# Patient Record
Sex: FEMALE | Race: WHITE | NOT HISPANIC OR LATINO | Employment: FULL TIME | URBAN - METROPOLITAN AREA
[De-identification: names, ages, dates, MRNs, and addresses within clinical notes are randomized per-mention and may not be internally consistent; named-entity substitution may affect disease eponyms.]

---

## 2017-04-12 DIAGNOSIS — F32.5 MAJOR DEPRESSION IN COMPLETE REMISSION (H): ICD-10-CM

## 2017-04-12 DIAGNOSIS — F41.1 GENERALIZED ANXIETY DISORDER: ICD-10-CM

## 2017-04-12 NOTE — TELEPHONE ENCOUNTER
Sertraline     Last Written Prescription Date: 10/11/16  Last Fill Quantity: 180, # refills: 1  Last Office Visit with [facility] primary care provider:  10/11/16   Next 5 appointments (look out 90 days)     Apr 17, 2017 10:00 AM CHIKIT   Abdulkadir Deluca with BRANNON Cerda CNP   [facility] ([facility]    [address] [address]   [phone]                   Last PHQ-9 score on record=   PHQ-9 SCORE 10/11/2016   Total Score 0

## 2017-04-17 ENCOUNTER — OFFICE VISIT (OUTPATIENT)
Dept: FAMILY MEDICINE | Facility: CLINIC | Age: 42
End: 2017-04-17
Payer: COMMERCIAL

## 2017-04-17 VITALS
BODY MASS INDEX: 26.42 KG/M2 | WEIGHT: 164.38 LBS | RESPIRATION RATE: 16 BRPM | TEMPERATURE: 97.9 F | SYSTOLIC BLOOD PRESSURE: 114 MMHG | HEIGHT: 66 IN | DIASTOLIC BLOOD PRESSURE: 66 MMHG | HEART RATE: 69 BPM

## 2017-04-17 DIAGNOSIS — F41.1 GAD (GENERALIZED ANXIETY DISORDER): Primary | ICD-10-CM

## 2017-04-17 DIAGNOSIS — F32.5 MAJOR DEPRESSION IN COMPLETE REMISSION (H): ICD-10-CM

## 2017-04-17 PROCEDURE — 99213 OFFICE O/P EST LOW 20 MIN: CPT | Performed by: NURSE PRACTITIONER

## 2017-04-17 RX ORDER — SERTRALINE HYDROCHLORIDE 100 MG/1
200 TABLET, FILM COATED ORAL DAILY
Qty: 180 TABLET | Refills: 3 | Status: SHIPPED | OUTPATIENT
Start: 2017-04-17 | End: 2018-04-02

## 2017-04-17 ASSESSMENT — ANXIETY QUESTIONNAIRES
7. FEELING AFRAID AS IF SOMETHING AWFUL MIGHT HAPPEN: NOT AT ALL
GAD7 TOTAL SCORE: 0
3. WORRYING TOO MUCH ABOUT DIFFERENT THINGS: NOT AT ALL
IF YOU CHECKED OFF ANY PROBLEMS ON THIS QUESTIONNAIRE, HOW DIFFICULT HAVE THESE PROBLEMS MADE IT FOR YOU TO DO YOUR WORK, TAKE CARE OF THINGS AT HOME, OR GET ALONG WITH OTHER PEOPLE: NOT DIFFICULT AT ALL
6. BECOMING EASILY ANNOYED OR IRRITABLE: NOT AT ALL
5. BEING SO RESTLESS THAT IT IS HARD TO SIT STILL: NOT AT ALL
2. NOT BEING ABLE TO STOP OR CONTROL WORRYING: NOT AT ALL
1. FEELING NERVOUS, ANXIOUS, OR ON EDGE: NOT AT ALL

## 2017-04-17 ASSESSMENT — PATIENT HEALTH QUESTIONNAIRE - PHQ9: 5. POOR APPETITE OR OVEREATING: NOT AT ALL

## 2017-04-17 NOTE — MR AVS SNAPSHOT
After Visit Summary   4/17/2017    Sofia Garcia    MRN: [id number]           Patient Information     Date Of Birth          1975        Visit Information        Provider Department      4/17/2017 10:00 AM Mary Clement APRN CNP [facility]        Today's Diagnoses     BRENDEN (generalized anxiety disorder)    -  1    Generalized anxiety disorder        Major depression in complete remission (H)          Care Instructions          Thank you for choosing [facility].  You may be receiving a survey in the mail from Ela Cabral regarding your visit today.  Please take a few minutes to complete and return the survey to let us know how we are doing.      If you have questions or concerns, please contact us via atVenu or you can contact your care team at [phone].    Our Clinic hours are:  Monday 6:40 am  to 7:00 pm  Tuesday -Friday 6:40 am to 5:00 pm    The [place] outpatient lab hours are:  Monday - Friday 6:10 am to 4:45 pm  Saturdays 7:00 am to 11:00 am  Appointments are required, call [phone]    If you have clinical questions after hours or would like to schedule an appointment,  call the clinic at [phone].        Follow-ups after your visit        Who to contact     If you have questions or need follow up information about today's clinic visit or your schedule please contact [facility] directly at [phone].  Normal or non-critical lab and imaging results will be communicated to you by Total-traxhart, letter or phone within 4 business days after the clinic has received the results. If you do not hear from us within 7 days, please contact the clinic through Usoundt or phone. If you have a critical or abnormal lab result, we will notify you by phone as soon as possible.  Submit refill requests through atVenu or call your pharmacy and they will forward the refill request to us. Please allow 3 business days for your refill to be completed.           "Additional Information About Your Visit        MyChart Information     [employer] gives you secure access to your electronic health record. If you see a primary care provider, you can also send messages to your care team and make appointments. If you have questions, please call your primary care clinic.  If you do not have a primary care provider, please call [phone] and they will assist you.        Care EveryWhere ID     This is your Care EveryWhere ID. This could be used by other organizations to access your [facility] medical records  [id number]        Your Vitals Were     Pulse Temperature Respirations Height Last Period BMI (Body Mass Index)    69 97.9  F (36.6  C) (Tympanic) 16 5' 5.55\" (1.665 m) 03/27/2017 26.9 kg/m2       Blood Pressure from Last 3 Encounters:   04/17/17 114/66   10/11/16 101/67   08/11/16 106/63    Weight from Last 3 Encounters:   04/17/17 164 lb 6 oz (74.6 kg)   10/11/16 161 lb 2 oz (73.1 kg)   08/11/16 174 lb (78.9 kg)              Today, you had the following     No orders found for display         Where to get your medicines      These medications were sent to [facility] [address] [place], [place] [address] [facility] AT [address]  [address] [facility] [address]     Phone:  [phone]     sertraline 100 MG tablet          Primary Care Provider Office Phone # Fax #    Mary BRANNON [facility] [phone]       [facility] [address] [facility] [address]        Thank you!     Thank you for choosing [facility]  for your care. Our goal is always to provide you with excellent care. Hearing back from our patients is one way we can continue to improve our services. Please take a few minutes to complete the written survey that you may receive in the mail after your visit with us. Thank you!             Your Updated Medication List - Protect others around you: Learn how to safely use, store and throw away your " medicines at www.disposemymeds.org.          This list is accurate as of: 4/17/17 10:20 AM.  Always use your most recent med list.                   Brand Name Dispense Instructions for use    sertraline 100 MG tablet    ZOLOFT    180 tablet    Take 2 tablets (200 mg) by mouth daily

## 2017-04-17 NOTE — PATIENT INSTRUCTIONS
Thank you for choosing [facility].  You may be receiving a survey in the mail from Ela Cabral regarding your visit today.  Please take a few minutes to complete and return the survey to let us know how we are doing.      If you have questions or concerns, please contact us via PowerPractical or you can contact your care team at [phone].    Our Clinic hours are:  Monday 6:40 am  to 7:00 pm  Tuesday -Friday 6:40 am to 5:00 pm    The [place] outpatient lab hours are:  Monday - Friday 6:10 am to 4:45 pm  Saturdays 7:00 am to 11:00 am  Appointments are required, call [phone]    If you have clinical questions after hours or would like to schedule an appointment,  call the clinic at [phone].

## 2017-04-17 NOTE — NURSING NOTE
"Chief Complaint   Patient presents with     Anxiety     Recheck and refills.  Phq9/Thang completed today.       Initial /66  Pulse 69  Temp 97.9  F (36.6  C) (Tympanic)  Resp 16  Ht 5' 5.55\" (1.665 m)  Wt 164 lb 6 oz (74.6 kg)  LMP 03/27/2017  BMI 26.9 kg/m2 Estimated body mass index is 26.9 kg/(m^2) as calculated from the following:    Height as of this encounter: 5' 5.55\" (1.665 m).    Weight as of this encounter: 164 lb 6 oz (74.6 kg).  Medication Reconciliation: complete  "

## 2017-04-17 NOTE — PROGRESS NOTES
SUBJECTIVE:                                                    Sofia Garcia is a 42 year old female who presents to clinic today for the following health issues:      Anxiety Follow-Up    Status since last visit: No change    Other associated symptoms:None    Complicating factors:   Significant life event: No   Current substance abuse: None  Depression symptoms: No  BRENDEN-7 SCORE 4/13/2016 10/11/2016 4/17/2017   Total Score 0 1 0        GAD7       Amount of exercise or physical activity: tries to walk on treadmill 2-3 times a week for 50 minutes.    Problems taking medications regularly: No    Medication side effects: none    Diet: has been following [employer] program.  In transition period.    Greatly helps symptoms.     Depression: well controlled.  No side effects from medications.       -------------------------------------    Problem list and histories reviewed & adjusted, as indicated.  Additional history: as documented    Patient Active Problem List   Diagnosis     Generalized anxiety disorder     Tobacco use disorder     Arthropathy     Major depression in complete remission (H)     Excessive or frequent menstruation     No past surgical history on file.    Social History   Substance Use Topics     Smoking status: Current Every Day Smoker     Packs/day: 1.00     Years: 20.00     Types: Cigarettes     Smokeless tobacco: Never Used     Alcohol use Yes      Comment: very rarely     Family History   Problem Relation Age of Onset     CANCER Mother      pancreatic     CANCER Paternal Aunt      liver cancer     C.A.D. Father      CABG, onset heart disease in late 40s or early 50s     Neuropathy Sister      MS           Reviewed and updated as needed this visit by clinical staff       Reviewed and updated as needed this visit by Provider         ROS:  Constitutional, HEENT, cardiovascular, pulmonary, GI, , musculoskeletal, neuro, skin, endocrine and psych systems are negative, except as otherwise  "noted.    OBJECTIVE:                                                    /66  Pulse 69  Temp 97.9  F (36.6  C) (Tympanic)  Resp 16  Ht 5' 5.55\" (1.665 m)  Wt 164 lb 6 oz (74.6 kg)  LMP 03/27/2017  BMI 26.9 kg/m2  Body mass index is 26.9 kg/(m^2).  GENERAL: healthy, alert and no distress  RESP: lungs clear to auscultation - no rales, rhonchi or wheezes  CV: regular rate and rhythm, normal S1 S2, no S3 or S4, no murmur, click or rub, no peripheral edema and peripheral pulses strong  MS: no gross musculoskeletal defects noted, no edema    Diagnostic Test Results:  none      ASSESSMENT/PLAN:                                                      1. BRENDEN (generalized anxiety disorder)  Well controlled  Continue Sertraline    2. Major depression in complete remission (H)  Well controlled  - sertraline (ZOLOFT) 100 MG tablet; Take 2 tablets (200 mg) by mouth daily  Dispense: 180 tablet; Refill: 3    Follow up in 1 year  Follow up within next month as you are due for PAP    BRANNON Cerda [facility]  "

## 2017-04-18 RX ORDER — SERTRALINE HYDROCHLORIDE 100 MG/1
TABLET, FILM COATED ORAL
Qty: 180 TABLET | Refills: 0 | OUTPATIENT
Start: 2017-04-18

## 2017-04-18 ASSESSMENT — PATIENT HEALTH QUESTIONNAIRE - PHQ9: SUM OF ALL RESPONSES TO PHQ QUESTIONS 1-9: 2

## 2017-04-18 ASSESSMENT — ANXIETY QUESTIONNAIRES: GAD7 TOTAL SCORE: 0

## 2017-07-08 ENCOUNTER — HEALTH MAINTENANCE LETTER (OUTPATIENT)
Age: 42
End: 2017-07-08

## 2017-09-11 ENCOUNTER — TELEPHONE (OUTPATIENT)
Dept: OBGYN | Facility: CLINIC | Age: 42
End: 2017-09-11

## 2017-09-11 DIAGNOSIS — F41.1 GAD (GENERALIZED ANXIETY DISORDER): Primary | ICD-10-CM

## 2017-09-11 RX ORDER — LORAZEPAM 2 MG/1
TABLET ORAL
Qty: 1 TABLET | Refills: 0 | Status: SHIPPED | OUTPATIENT
Start: 2017-09-11 | End: 2017-11-22

## 2017-09-11 NOTE — TELEPHONE ENCOUNTER
Inform pt that she would need a ride home since the sedative will make her sleepy; Rx generated for Lorazepam 2mg po take 20 mins prior to scheduled procedure  Chelsea Martinez

## 2017-09-11 NOTE — TELEPHONE ENCOUNTER
Pt requests to have pap and pelvic exam prior to IUD insertion.  Pt will have a  for this procedure.    Farheen Bee  [facility] RN

## 2017-09-11 NOTE — TELEPHONE ENCOUNTER
Pt has annual and IUD insertion on 10/20/17 and was told at her last appointment she could get some kind of sedative to help her at this appointment.  Please call to discuss.    Please call-     Mary Noguera  Clinic Station

## 2017-10-20 ENCOUNTER — OFFICE VISIT (OUTPATIENT)
Dept: OBGYN | Facility: CLINIC | Age: 42
End: 2017-10-20
Payer: COMMERCIAL

## 2017-10-20 VITALS
WEIGHT: 161.6 LBS | HEIGHT: 66 IN | BODY MASS INDEX: 25.97 KG/M2 | SYSTOLIC BLOOD PRESSURE: 109 MMHG | DIASTOLIC BLOOD PRESSURE: 72 MMHG | HEART RATE: 103 BPM

## 2017-10-20 DIAGNOSIS — Z01.419 ENCOUNTER FOR GYNECOLOGICAL EXAMINATION WITHOUT ABNORMAL FINDING: Primary | ICD-10-CM

## 2017-10-20 DIAGNOSIS — Z30.430 ENCOUNTER FOR INSERTION OF MIRENA IUD: ICD-10-CM

## 2017-10-20 DIAGNOSIS — N92.0 EXCESSIVE OR FREQUENT MENSTRUATION: ICD-10-CM

## 2017-10-20 PROBLEM — R87.610 PAPANICOLAOU SMEAR OF CERVIX WITH ATYPICAL SQUAMOUS CELLS OF UNDETERMINED SIGNIFICANCE (ASC-US): Status: RESOLVED | Noted: 2017-10-20 | Resolved: 2017-10-20

## 2017-10-20 PROBLEM — R87.610 PAPANICOLAOU SMEAR OF CERVIX WITH ATYPICAL SQUAMOUS CELLS OF UNDETERMINED SIGNIFICANCE (ASC-US): Status: ACTIVE | Noted: 2017-10-20

## 2017-10-20 PROCEDURE — 88305 TISSUE EXAM BY PATHOLOGIST: CPT | Performed by: OBSTETRICS & GYNECOLOGY

## 2017-10-20 PROCEDURE — 99396 PREV VISIT EST AGE 40-64: CPT | Mod: 25 | Performed by: OBSTETRICS & GYNECOLOGY

## 2017-10-20 PROCEDURE — 87624 HPV HI-RISK TYP POOLED RSLT: CPT | Performed by: OBSTETRICS & GYNECOLOGY

## 2017-10-20 PROCEDURE — 58300 INSERT INTRAUTERINE DEVICE: CPT | Performed by: OBSTETRICS & GYNECOLOGY

## 2017-10-20 PROCEDURE — 58100 BIOPSY OF UTERUS LINING: CPT | Performed by: OBSTETRICS & GYNECOLOGY

## 2017-10-20 PROCEDURE — G0145 SCR C/V CYTO,THINLAYER,RESCR: HCPCS | Performed by: OBSTETRICS & GYNECOLOGY

## 2017-10-20 NOTE — PROGRESS NOTES
SUBJECTIVE:   CC: Sofia Garcia is an 42 year old woman who presents for preventive health visit.   She has a several year h/o heavy periods; partner has vasectomy; no plans for children; we discussed options last year and she wishes to have Mirena IUD placed    Healthy Habits:    Do you get at least three servings of calcium containing foods daily (dairy, green leafy vegetables, etc.)? yes and no, taking calcium and/or vitamin D supplement: no    Amount of exercise or daily activities, outside of work: 0 day(s) per week    Problems taking medications regularly No    Medication side effects: No    Have you had an eye exam in the past two years? yes    Do you see a dentist twice per year? no    Do you have sleep apnea, excessive snoring or daytime drowsiness?yes        Today's PHQ-2 Score:   PHQ-2 ( 1999 Pfizer) 10/20/2017 8/17/2015   Q1: Little interest or pleasure in doing things 0 0   Q2: Feeling down, depressed or hopeless 0 0   PHQ-2 Score 0 0       Abuse: Current or Past(Physical, Sexual or Emotional)- No  Do you feel safe in your environment - Yes    Social History   Substance Use Topics     Smoking status: Current Every Day Smoker     Packs/day: 1.00     Years: 20.00     Types: Cigarettes     Smokeless tobacco: Never Used     Alcohol use Yes      Comment: very rarely     The patient does not drink >3 drinks per day nor >7 drinks per week.    Reviewed orders with patient.  Reviewed health maintenance and updated orders accordingly - Yes  Labs reviewed in EPIC  BP Readings from Last 3 Encounters:   10/20/17 109/72   04/17/17 114/66   10/11/16 101/67    Wt Readings from Last 3 Encounters:   10/20/17 161 lb 9.6 oz (73.3 kg)   04/17/17 164 lb 6 oz (74.6 kg)   10/11/16 161 lb 2 oz (73.1 kg)                      Patient under age 50, mutual decision reflected in health maintenance.        Pertinent mammograms are reviewed under the imaging tab.  History of abnormal Pap smear: NO - age 30- 65 PAP every 3 years  recommended    Reviewed and updated as needed this visit by clinical staff         Reviewed and updated as needed this visit by Provider              ROS:  C: NEGATIVE for fever, chills, change in weight  I: NEGATIVE for worrisome rashes, moles or lesions  E: NEGATIVE for vision changes or irritation  ENT: NEGATIVE for ear, mouth and throat problems  R: NEGATIVE for significant cough or SOB  B: NEGATIVE for masses, tenderness or discharge  CV: NEGATIVE for chest pain, palpitations or peripheral edema  GI: NEGATIVE for nausea, abdominal pain, heartburn, or change in bowel habits   female: positive for heavy menses  M: NEGATIVE for significant arthralgias or myalgia  N: NEGATIVE for weakness, dizziness or paresthesias  P: NEGATIVE for changes in mood or affect    OBJECTIVE:   There were no vitals taken for this visit.  EXAM:  GENERAL: healthy, alert and no distress  EYES: Eyes grossly normal to inspection, PERRL and conjunctivae and sclerae normal  HENT: ear canals and TM's normal, nose and mouth without ulcers or lesions  NECK: no adenopathy, no asymmetry, masses, or scars and thyroid normal to palpation  RESP: lungs clear to auscultation - no rales, rhonchi or wheezes  BREAST: normal without masses, tenderness or nipple discharge and no palpable axillary masses or adenopathy  CV: regular rate and rhythm, normal S1 S2, no S3 or S4, no murmur, click or rub, no peripheral edema and peripheral pulses strong  ABDOMEN: soft, nontender, no hepatosplenomegaly, no masses and bowel sounds normal   (female): normal female external genitalia, normal urethral meatus, vaginal mucosa pink, moist, well rugated, and normal cervix/adnexa/uterus without masses or discharge  MS: no gross musculoskeletal defects noted, no edema  SKIN: no suspicious lesions or rashes  NEURO: Normal strength and tone, mentation intact and speech normal  PSYCH: mentation appears normal, affect normal/bright    The cervix was visualized with the  "speculum, and cleansed with an iodine solution.  The anterior cervix was grasped with a tenaculum and a pipelle advanced into the uterine cavity, with a sounded depth of 7 cm.  A representative sample was aspirated from the cavity and sent for pathological examination.    Procedure note:The patient was given informed consent which was signed and dated.  The patient was placed in a supine position and a speculum placed with the vagina, to visualize the cervix.  It was prepped with iodine and the anterior cervix grasped with a tenaculum.  The cervix was sounded and the Mirena  IUD placed in the cavity at the fundus. The string was trimmed 2-3cm from the external cervical os.  A post-procedure ultrasound was performed to assure the IUD was in place in the uterine cavity.  The patient was given post-procedure instructions and left the clinic in stable condition.        ASSESSMENT/PLAN:       ICD-10-CM    1. Encounter for gynecological examination without abnormal finding Z01.419 Pap imaged thin layer screen with HPV - recommended age 30 - 65 years (select HPV order below)     HPV High Risk Types DNA Cervical   2. Excessive or frequent menstruation N92.0 Surgical pathology exam     ENDOMETRIAL BIOPSY W/O CERVICAL DILATION     INSERTION INTRAUTERINE DEVICE     HC LEVONORGESTREL IU 52MG 5 YR       COUNSELING:   Reviewed preventive health counseling, as reflected in patient instructions  Special attention given to:        effects of Mirena       Regular exercise       Healthy diet/nutrition       Vision screening         reports that she has been smoking Cigarettes.  She has a 20.00 pack-year smoking history. She has never used smokeless tobacco.  Tobacco Cessation Action Plan: Information offered: Patient not interested at this time  Estimated body mass index is 26.9 kg/(m^2) as calculated from the following:    Height as of 4/17/17: 5' 5.55\" (1.665 m).    Weight as of 4/17/17: 164 lb 6 oz (74.6 kg).         Counseling " Resources:  ATP IV Guidelines  Pooled Cohorts Equation Calculator  Breast Cancer Risk Calculator  FRAX Risk Assessment  ICSI Preventive Guidelines  Dietary Guidelines for Americans, 2010  USDA's MyPlate  ASA Prophylaxis  Lung CA Screening    Chelsea Martinez MD  [facility]

## 2017-10-20 NOTE — MR AVS SNAPSHOT
After Visit Summary   10/20/2017    Sofia Garcia    MRN: [id number]           Patient Information     Date Of Birth          1975        Visit Information        Provider Department      10/20/2017 1:30 PM Chelsea Martinez MD [facility]        Today's Diagnoses     Encounter for gynecological examination without abnormal finding    -  1    Excessive or frequent menstruation          Care Instructions      Preventive Health Recommendations  Female Ages 40 to 49    Yearly exam:     See your health care provider every year in order to  1. Review health changes.   2. Discuss preventive care.    3. Review your medicines if your doctor prescribed any.      Get a Pap test every three years (unless you have an abnormal result and your provider advises testing more often).      If you get Pap tests with HPV test, you only need to test every 5 years, unless you have an abnormal result. You do not need a Pap test if your uterus was removed (hysterectomy) and you have not had cancer.      You should be tested each year for STDs (sexually transmitted diseases), if you're at risk.       Ask your doctor if you should have a mammogram.      Have a colonoscopy (test for colon cancer) if someone in your family has had colon cancer or polyps before age 50.       Have a cholesterol test every 5 years.       Have a diabetes test (fasting glucose) after age 45. If you are at risk for diabetes, you should have this test every 3 years.    Shots: Get a flu shot each year. Get a tetanus shot every 10 years.     Nutrition:     Eat at least 5 servings of fruits and vegetables each day.    Eat whole-grain bread, whole-wheat pasta and brown rice instead of white grains and rice.    Talk to your provider about Calcium and Vitamin D.     Lifestyle    Exercise at least 150 minutes a week (an average of 30 minutes a day, 5 days a week). This will help you control your weight and prevent disease.    Limit  "alcohol to one drink per day.    No smoking.     Wear sunscreen to prevent skin cancer.    See your dentist every six months for an exam and cleaning.          Follow-ups after your visit        Who to contact     If you have questions or need follow up information about today's clinic visit or your schedule please contact [facility] directly at [phone].  Normal or non-critical lab and imaging results will be communicated to you by MyChart, letter or phone within 4 business days after the clinic has received the results. If you do not hear from us within 7 days, please contact the clinic through Invuphart or phone. If you have a critical or abnormal lab result, we will notify you by phone as soon as possible.  Submit refill requests through GlassesOff or call your pharmacy and they will forward the refill request to us. Please allow 3 business days for your refill to be completed.          Additional Information About Your Visit        [employer] Information     GlassesOff gives you secure access to your electronic health record. If you see a primary care provider, you can also send messages to your care team and make appointments. If you have questions, please call your primary care clinic.  If you do not have a primary care provider, please call [phone] and they will assist you.        Care EveryWhere ID     This is your Care EveryWhere ID. This could be used by other organizations to access your [facility] medical records  [id number]        Your Vitals Were     Pulse Height Last Period BMI (Body Mass Index)          103 5' 5.5\" (1.664 m) 09/29/2017 (Exact Date) 26.48 kg/m2         Blood Pressure from Last 3 Encounters:   10/20/17 109/72   04/17/17 114/66   10/11/16 101/67    Weight from Last 3 Encounters:   10/20/17 161 lb 9.6 oz (73.3 kg)   04/17/17 164 lb 6 oz (74.6 kg)   10/11/16 161 lb 2 oz (73.1 kg)              We Performed the Following     ENDOMETRIAL BIOPSY W/O CERVICAL DILATION     HC " LEVONORGESTREL IU 52MG 5 YR     HPV High Risk Types DNA Cervical     INSERTION INTRAUTERINE DEVICE     Pap imaged thin layer screen with HPV - recommended age 30 - 65 years (select HPV order below)     Surgical pathology exam        Primary Care Provider Office Phone # Fax #    BRANNON Cerda [phone] [address] [facility] [address]        Equal Access to Services     GEOVANNA HOLLOWAY : Hadii aad ku hadasho Soomaali, waaxda luqadaha, qaybta kaalmada adeegyada, waxay idiin hayaan adeeg kharash la'aan june. So [place] -[phone].    ATENCIÓN: Si habla español, tiene a prieto disposición servicios gratuitos de asistencia lingüística. Llame al [phone].    We comply with applicable federal civil rights laws and [place] laws. We do not discriminate on the basis of race, color, national origin, age, disability, sex, sexual orientation, or gender identity.            Thank you!     Thank you for choosing [facility]  for your care. Our goal is always to provide you with excellent care. Hearing back from our patients is one way we can continue to improve our services. Please take a few minutes to complete the written survey that you may receive in the mail after your visit with us. Thank you!             Your Updated Medication List - Protect others around you: Learn how to safely use, store and throw away your medicines at www.disposemymeds.org.          This list is accurate as of: 10/20/17  2:07 PM.  Always use your most recent med list.                   Brand Name Dispense Instructions for use Diagnosis    LORazepam 2 MG tablet    ATIVAN    1 tablet    Take tab 20 mins prior to scheduled procedure    BRENDEN (generalized anxiety disorder)       sertraline 100 MG tablet    ZOLOFT    180 tablet    Take 2 tablets (200 mg) by mouth daily    Major depression in complete remission (H)

## 2017-10-20 NOTE — NURSING NOTE
"Chief Complaint   Patient presents with     Physical       Initial /72 (BP Location: Right arm, Patient Position: Chair, Cuff Size: Adult Regular)  Pulse 103  Ht 5' 5.5\" (1.664 m)  Wt 161 lb 9.6 oz (73.3 kg)  LMP 09/29/2017 (Exact Date)  BMI 26.48 kg/m2 Estimated body mass index is 26.48 kg/(m^2) as calculated from the following:    Height as of this encounter: 5' 5.5\" (1.664 m).    Weight as of this encounter: 161 lb 9.6 oz (73.3 kg).  Medication Reconciliation: complete     Cruz Wisdom CMA      "

## 2017-10-24 LAB
COPATH REPORT: NORMAL
COPATH REPORT: NORMAL
PAP: NORMAL

## 2017-10-26 LAB
FINAL DIAGNOSIS: NORMAL
HPV HR 12 DNA CVX QL NAA+PROBE: NEGATIVE
HPV16 DNA SPEC QL NAA+PROBE: NEGATIVE
HPV18 DNA SPEC QL NAA+PROBE: NEGATIVE
SPECIMEN DESCRIPTION: NORMAL

## 2017-11-22 ENCOUNTER — OFFICE VISIT (OUTPATIENT)
Dept: OBGYN | Facility: CLINIC | Age: 42
End: 2017-11-22
Payer: COMMERCIAL

## 2017-11-22 VITALS
WEIGHT: 180.2 LBS | HEIGHT: 66 IN | SYSTOLIC BLOOD PRESSURE: 112 MMHG | BODY MASS INDEX: 28.96 KG/M2 | HEART RATE: 80 BPM | DIASTOLIC BLOOD PRESSURE: 75 MMHG

## 2017-11-22 DIAGNOSIS — R10.9 ABDOMINAL CRAMPS: Primary | ICD-10-CM

## 2017-11-22 PROCEDURE — 99213 OFFICE O/P EST LOW 20 MIN: CPT | Performed by: OBSTETRICS & GYNECOLOGY

## 2017-11-22 NOTE — NURSING NOTE
"Chief Complaint   Patient presents with     RECHECK     IUD placement - cramps and bleeing since getting IUD       Initial /75 (BP Location: Right arm, Patient Position: Chair, Cuff Size: Adult Regular)  Pulse 80  Ht 5' 5.5\" (1.664 m)  Wt 180 lb 3.2 oz (81.7 kg)  BMI 29.53 kg/m2 Estimated body mass index is 29.53 kg/(m^2) as calculated from the following:    Height as of this encounter: 5' 5.5\" (1.664 m).    Weight as of this encounter: 180 lb 3.2 oz (81.7 kg).  Medication Reconciliation: complete     Cruz Wisdom CMA      "

## 2017-11-22 NOTE — MR AVS SNAPSHOT
"              After Visit Summary   11/22/2017    Sofia Garcia    MRN: [id number]           Patient Information     Date Of Birth          1975        Visit Information        Provider Department      11/22/2017 3:15 PM Chelsea Martinez MD [facility]        Today's Diagnoses     Abdominal cramps    -  1       Follow-ups after your visit        Who to contact     If you have questions or need follow up information about today's clinic visit or your schedule please contact [facility] directly at [phone].  Normal or non-critical lab and imaging results will be communicated to you by [facility], letter or phone within 4 business days after the clinic has received the results. If you do not hear from us within 7 days, please contact the clinic through i-drivet or phone. If you have a critical or abnormal lab result, we will notify you by phone as soon as possible.  Submit refill requests through Medialive or call your pharmacy and they will forward the refill request to us. Please allow 3 business days for your refill to be completed.          Additional Information About Your Visit        MyChart Information     Medialive gives you secure access to your electronic health record. If you see a primary care provider, you can also send messages to your care team and make appointments. If you have questions, please call your primary care clinic.  If you do not have a primary care provider, please call [phone] and they will assist you.        Care EveryWhere ID     This is your Care EveryWhere ID. This could be used by other organizations to access your [place] medical records  [id number]        Your Vitals Were     Pulse Height BMI (Body Mass Index)             80 5' 5.5\" (1.664 m) 29.53 kg/m2          Blood Pressure from Last 3 Encounters:   11/22/17 112/75   10/20/17 109/72   04/17/17 114/66    Weight from Last 3 Encounters:   11/22/17 180 lb 3.2 oz (81.7 kg)   10/20/17 161 " lb 9.6 oz (73.3 kg)   04/17/17 164 lb 6 oz (74.6 kg)              We Performed the Following     *UA reflex to Microscopic     Urine Culture Aerobic Bacterial        Primary Care Provider Office Phone # Fax #    BRANNON Cerda [phone] [address] [facility] [address]        Equal Access to Services     GEOVANNA HOLLOWAY : Hadii aad ku hadasho Soomaali, waaxda luqadaha, qaybta kaalmada adeegyada, waxay idiin hayaan adeeg kharash la'aan . So [facility] [phone].    ATENCIÓN: Si habla español, tiene a prieto disposición servicios gratuitos de asistencia lingüística. Llame al [phone].    We comply with applicable federal civil rights laws and [place] laws. We do not discriminate on the basis of race, color, national origin, age, disability, sex, sexual orientation, or gender identity.            Thank you!     Thank you for choosing [facility]  for your care. Our goal is always to provide you with excellent care. Hearing back from our patients is one way we can continue to improve our services. Please take a few minutes to complete the written survey that you may receive in the mail after your visit with us. Thank you!             Your Updated Medication List - Protect others around you: Learn how to safely use, store and throw away your medicines at www.disposemymeds.org.          This list is accurate as of: 11/22/17  3:38 PM.  Always use your most recent med list.                   Brand Name Dispense Instructions for use Diagnosis    sertraline 100 MG tablet    ZOLOFT    180 tablet    Take 2 tablets (200 mg) by mouth daily    Major depression in complete remission (H)

## 2017-11-22 NOTE — PROGRESS NOTES
Sofia is a 42 year old   female who presents for IUD check, Mirena IUD inserted 10/20/17; since that time she has had 2 bleeding episodes and daily cramping (mild); no fever, chills or foul odor.    Patient Active Problem List    Diagnosis Date Noted     Encounter for insertion of mirena IUD 10/20/2017     Priority: Medium     Lot # [id number] Expiration date        Excessive or frequent menstruation 2016     Priority: Medium     Major depression in complete remission (H) 2016     Priority: Medium     Arthropathy 2006     Priority: Medium     Problem list name updated by automated process. Provider to review       Generalized anxiety disorder 2005     Priority: Medium     Dr. Traice Ochoa, psychiatrist       Tobacco use disorder 2005     Priority: Medium       All systems were reviewed and pertinent information in noted in subjective/HPI.    Past Medical History:   Diagnosis Date     Contact dermatitis and other eczema, due to unspecified cause     hands       History reviewed. No pertinent surgical history.      Current Outpatient Prescriptions:      sertraline (ZOLOFT) 100 MG tablet, Take 2 tablets (200 mg) by mouth daily, Disp: 180 tablet, Rfl: 3    ALLERGIES:  Review of patient's allergies indicates no known allergies.    Social History     Social History     Marital status: Single     Spouse name: N/A     Number of children: N/A     Years of education: N/A     Social History Main Topics     Smoking status: Current Every Day Smoker     Packs/day: 1.00     Years: 20.00     Types: Cigarettes     Smokeless tobacco: Never Used     Alcohol use Yes      Comment: very rarely     Drug use: No     Sexual activity: Yes     Partners: Male     Birth control/ protection: Male Surgical      Comment: stopped taking birth control 2015     Other Topics Concern     Parent/Sibling W/ Cabg, Mi Or Angioplasty Before 65f 55m? Yes     father     Social History Narrative       Family  "History   Problem Relation Age of Onset     CANCER Mother      pancreatic     C.A.D. Father      CABG, onset heart disease in late 40s or early 50s     Neuropathy Sister      MS     CANCER Paternal Aunt      liver cancer       OBJECTIVE:  Vitals: /75 (BP Location: Right arm, Patient Position: Chair, Cuff Size: Adult Regular)  Pulse 80  Ht 5' 5.5\" (1.664 m)  Wt 180 lb 3.2 oz (81.7 kg)  BMI 29.53 kg/m2 BMI= Body mass index is 29.53 kg/(m^2).   No LMP recorded. Patient is not currently having periods (Reason: IUD).     GENERAL APPEARANCE: healthy, alert and no distress  ABDOMEN:  soft, nontender, no hepato-splenomegaly or hernias  PELVIC:  EGBUS:  within normal limits  VAGINA:  normoestrogenic, well-supported, no unusual discharge  CERVIX:  smooth, non-friable, no gross lesions, thin-layer PAP was not taken   UTERUS:  anteverted, not enlarged, non tender  ADNEXAE:  non-tender, no masses palpable, no cul de sac nodularity    Transvaginal sonogram performed:   IUD visualized in central cavity with arms in the fundus; no free fluid; no adnexal masses    ASSESSMENT:      ICD-10-CM    1. Abdominal cramps R10.9 Urine Culture Aerobic Bacterial     *UA reflex to Microscopic       PLAN:  Recommend continued observation, check UA/CS  If continued cramping, can remove IUD in future  Chelsea Martinez MD  [facility]      Chelsea Martinez MD    "

## 2018-03-08 ENCOUNTER — TRANSFERRED RECORDS (OUTPATIENT)
Dept: HEALTH INFORMATION MANAGEMENT | Facility: CLINIC | Age: 43
End: 2018-03-08

## 2018-04-02 ENCOUNTER — OFFICE VISIT (OUTPATIENT)
Dept: FAMILY MEDICINE | Facility: CLINIC | Age: 43
End: 2018-04-02
Payer: COMMERCIAL

## 2018-04-02 VITALS
TEMPERATURE: 97.2 F | HEART RATE: 79 BPM | DIASTOLIC BLOOD PRESSURE: 77 MMHG | SYSTOLIC BLOOD PRESSURE: 125 MMHG | WEIGHT: 183 LBS | BODY MASS INDEX: 29.99 KG/M2

## 2018-04-02 DIAGNOSIS — F41.9 ANXIETY: Primary | ICD-10-CM

## 2018-04-02 DIAGNOSIS — L91.8 SKIN TAG: ICD-10-CM

## 2018-04-02 DIAGNOSIS — F32.5 MAJOR DEPRESSION IN COMPLETE REMISSION (H): ICD-10-CM

## 2018-04-02 PROCEDURE — 99213 OFFICE O/P EST LOW 20 MIN: CPT | Performed by: NURSE PRACTITIONER

## 2018-04-02 RX ORDER — SERTRALINE HYDROCHLORIDE 100 MG/1
200 TABLET, FILM COATED ORAL DAILY
Qty: 180 TABLET | Refills: 3 | Status: SHIPPED | OUTPATIENT
Start: 2018-04-02 | End: 2019-03-18

## 2018-04-02 ASSESSMENT — ANXIETY QUESTIONNAIRES
4. TROUBLE RELAXING: NOT AT ALL
6. BECOMING EASILY ANNOYED OR IRRITABLE: SEVERAL DAYS
7. FEELING AFRAID AS IF SOMETHING AWFUL MIGHT HAPPEN: NOT AT ALL
5. BEING SO RESTLESS THAT IT IS HARD TO SIT STILL: NOT AT ALL
3. WORRYING TOO MUCH ABOUT DIFFERENT THINGS: NOT AT ALL
IF YOU CHECKED OFF ANY PROBLEMS ON THIS QUESTIONNAIRE, HOW DIFFICULT HAVE THESE PROBLEMS MADE IT FOR YOU TO DO YOUR WORK, TAKE CARE OF THINGS AT HOME, OR GET ALONG WITH OTHER PEOPLE: NOT DIFFICULT AT ALL
1. FEELING NERVOUS, ANXIOUS, OR ON EDGE: NOT AT ALL
2. NOT BEING ABLE TO STOP OR CONTROL WORRYING: NOT AT ALL
GAD7 TOTAL SCORE: 1

## 2018-04-02 NOTE — NURSING NOTE
"Chief Complaint   Patient presents with     Recheck Medication     anxiety- zoloft     Derm Problem     check skin tag on nose and red spot on chest       Initial /77 (BP Location: Left arm, Patient Position: Sitting, Cuff Size: Adult Regular)  Pulse 79  Temp 97.2  F (36.2  C) (Tympanic)  Wt 183 lb (83 kg)  BMI 29.99 kg/m2 Estimated body mass index is 29.99 kg/(m^2) as calculated from the following:    Height as of 11/22/17: 5' 5.5\" (1.664 m).    Weight as of this encounter: 183 lb (83 kg).  Medication Reconciliation: complete    "

## 2018-04-02 NOTE — PROGRESS NOTES
SUBJECTIVE:   Sofia Garcia is a 43 year old female who presents to clinic today for the following health issues:    Chief Complaint   Patient presents with     Recheck Medication     anxiety- zoloft     Derm Problem     check skin tag on nose and red spot on chest     PHQ-9 SCORE 10/11/2016 4/17/2017 4/2/2018   Total Score 0 2 3     BRENDEN-7 SCORE 10/11/2016 4/17/2017 4/2/2018   Total Score 1 0 1     Skin Tag: on corner of nose- tiny- no bleeding or scabbing.     -------------------------------------    Problem list and histories reviewed & adjusted, as indicated.  Additional history: as documented    Patient Active Problem List   Diagnosis     Tobacco use disorder     Arthropathy     Major depression in complete remission (H)     Excessive or frequent menstruation     Encounter for insertion of mirena IUD     No past surgical history on file.    Social History   Substance Use Topics     Smoking status: Current Every Day Smoker     Packs/day: 1.00     Years: 20.00     Types: Cigarettes     Smokeless tobacco: Never Used     Alcohol use Yes      Comment: very rarely     Family History   Problem Relation Age of Onset     CANCER Mother      pancreatic     C.A.D. Father      CABG, onset heart disease in late 40s or early 50s     Neuropathy Sister      MS     CANCER Paternal Aunt      liver cancer           Reviewed and updated as needed this visit by clinical staff  Allergies       Reviewed and updated as needed this visit by Provider         ROS:  Constitutional, HEENT, cardiovascular, pulmonary, GI, , musculoskeletal, neuro, skin, endocrine and psych systems are negative, except as otherwise noted.    OBJECTIVE:     /77 (BP Location: Left arm, Patient Position: Sitting, Cuff Size: Adult Regular)  Pulse 79  Temp 97.2  F (36.2  C) (Tympanic)  Wt 183 lb (83 kg)  BMI 29.99 kg/m2  Body mass index is 29.99 kg/(m^2).  GENERAL: healthy, alert and no distress  ABDOMEN: soft, nontender, no hepatosplenomegaly, no  masses and bowel sounds normal  MS: no gross musculoskeletal defects noted, no edema  PSYCH: mentation appears normal, affect normal/bright    Diagnostic Test Results:  none     ASSESSMENT/PLAN:       1. Major depression in complete remission (H)  Well controlled   - Refilled sertraline (ZOLOFT) 100 MG tablet; Take 2 tablets (200 mg) by mouth daily  Dispense: 180 tablet; Refill: 3    2. Anxiety   Refilled sertraline (ZOLOFT) 100 MG tablet; Take 2 tablets (200 mg) by mouth daily  Dispense: 180 tablet; Refill: 3    3. Skin Tag  - discussed options with patient- decided not to remove at this time- due to small size.       BRANNON Cerda [facility]

## 2018-04-02 NOTE — MR AVS SNAPSHOT
After Visit Summary   4/2/2018    Sofia Garcia    MRN: [id number]           Patient Information     Date Of Birth          1975        Visit Information        Provider Department      4/2/2018 7:00 AM Mary Clement APRN CNP [facility]        Today's Diagnoses     Anxiety    -  1    Major depression in complete remission (H)           Follow-ups after your visit        Who to contact     If you have questions or need follow up information about today's clinic visit or your schedule please contact [facility] directly at [phone].  Normal or non-critical lab and imaging results will be communicated to you by PRXhart, letter or phone within 4 business days after the clinic has received the results. If you do not hear from us within 7 days, please contact the clinic through PRXhart or phone. If you have a critical or abnormal lab result, we will notify you by phone as soon as possible.  Submit refill requests through SlapVid or call your pharmacy and they will forward the refill request to us. Please allow 3 business days for your refill to be completed.          Additional Information About Your Visit        MyChart Information     SlapVid gives you secure access to your electronic health record. If you see a primary care provider, you can also send messages to your care team and make appointments. If you have questions, please call your primary care clinic.  If you do not have a primary care provider, please call [phone] and they will assist you.        Care EveryWhere ID     This is your Care EveryWhere ID. This could be used by other organizations to access your [place] medical records  [id number]        Your Vitals Were     Pulse Temperature BMI (Body Mass Index)             79 97.2  F (36.2  C) (Tympanic) 29.99 kg/m2          Blood Pressure from Last 3 Encounters:   04/02/18 125/77   11/22/17 112/75   10/20/17 109/72    Weight from Last 3  Encounters:   04/02/18 183 lb (83 kg)   11/22/17 180 lb 3.2 oz (81.7 kg)   10/20/17 161 lb 9.6 oz (73.3 kg)              Today, you had the following     No orders found for display         Where to get your medicines      These medications were sent to [id number] - [facility] [address] AT [facility] OF [facility] [address]  [address], [facility] [address]     Phone:  [phone]     sertraline 100 MG tablet          Primary Care Provider Office Phone # Fax #    Mary Clement, APRN [facility] [phone] [address] [facility] [address]        Equal Access to Services     GEOVANNA HOLLOWAY : Arslan ponceo Soaakash, waaxda luqadaha, qaybta kaalmada adeegyada, zachary tucker . So [facility] [phone].    ATENCIÓN: Si habla español, tiene a prieto disposición servicios gratuitos de asistencia lingüística. Llame al [phone].    We comply with applicable federal civil rights laws and [place] laws. We do not discriminate on the basis of race, color, national origin, age, disability, sex, sexual orientation, or gender identity.            Thank you!     Thank you for choosing [facility]  for your care. Our goal is always to provide you with excellent care. Hearing back from our patients is one way we can continue to improve our services. Please take a few minutes to complete the written survey that you may receive in the mail after your visit with us. Thank you!             Your Updated Medication List - Protect others around you: Learn how to safely use, store and throw away your medicines at www.disposemymeds.org.          This list is accurate as of 4/2/18  7:16 AM.  Always use your most recent med list.                   Brand Name Dispense Instructions for use Diagnosis    sertraline 100 MG tablet    ZOLOFT    180 tablet    Take 2 tablets (200 mg) by mouth daily    Major depression in complete remission (H)

## 2018-04-03 ASSESSMENT — ANXIETY QUESTIONNAIRES: GAD7 TOTAL SCORE: 1

## 2018-04-03 ASSESSMENT — PATIENT HEALTH QUESTIONNAIRE - PHQ9: SUM OF ALL RESPONSES TO PHQ QUESTIONS 1-9: 3

## 2018-11-28 ENCOUNTER — HOSPITAL ENCOUNTER (EMERGENCY)
Facility: CLINIC | Age: 43
Discharge: HOME | End: 2018-11-28
Attending: NURSE PRACTITIONER | Admitting: NURSE PRACTITIONER
Payer: COMMERCIAL

## 2018-11-28 VITALS
SYSTOLIC BLOOD PRESSURE: 127 MMHG | HEIGHT: 65 IN | TEMPERATURE: 98.4 F | BODY MASS INDEX: 31.65 KG/M2 | DIASTOLIC BLOOD PRESSURE: 79 MMHG | WEIGHT: 190 LBS | OXYGEN SATURATION: 96 %

## 2018-11-28 DIAGNOSIS — L03.011 PARONYCHIA OF FINGER, RIGHT: ICD-10-CM

## 2018-11-28 PROCEDURE — G0463 HOSPITAL OUTPT CLINIC VISIT: HCPCS | Mod: 25 | Performed by: NURSE PRACTITIONER

## 2018-11-28 PROCEDURE — 10060 I&D ABSCESS SIMPLE/SINGLE: CPT | Mod: Z6 | Performed by: NURSE PRACTITIONER

## 2018-11-28 PROCEDURE — 99214 OFFICE O/P EST MOD 30 MIN: CPT | Mod: 25 | Performed by: NURSE PRACTITIONER

## 2018-11-28 PROCEDURE — 10060 I&D ABSCESS SIMPLE/SINGLE: CPT | Performed by: NURSE PRACTITIONER

## 2018-11-28 RX ORDER — CEPHALEXIN 500 MG/1
500 CAPSULE ORAL 4 TIMES DAILY
Qty: 28 CAPSULE | Refills: 0 | Status: SHIPPED | OUTPATIENT
Start: 2018-11-28 | End: 2019-03-18

## 2018-11-28 NOTE — ED AVS SNAPSHOT
[facility]    [address] [facility] [address]    Phone:  [phone]    Fax:  [phone]                                       Sofia Garcia   MRN: [id number]    Department:  [facility]   Date of Visit:  11/28/2018           Patient Information     Date Of Birth          1975        Your diagnoses for this visit were:     Paronychia of toe, right        You were seen by Frida Nicole APRN CNP.      Follow-up Information     Follow up with Mary Clement APRN CNP.    Specialty:  Nurse Practitioner    Why:  As needed    Contact information:    [address] [facility] [id number] [phone]          Discharge Instructions         Warm soapy soaks 3-4 times a day.  Keflex 500 mg 4 times a day for 7 days.  Return for fever, increased redness, swelling, or pain.    Paronychia of the Finger or Toe  Paronychia is an infection near a fingernail or toenail. It usually occurs when an opening in the cuticle or an ingrown toenail lets bacteria under the skin.  The infection will need to be drained if pus is present. If the infection has been caught early, you may need only antibiotic treatment. Healing will take about 1 to 2 weeks.  Home care  Follow these guidelines when caring for yourself at home:    Clean and soak the toe or finger. Do this 2 times a day for the first 3 days. To do so:  ? Soak your foot or hand in a tub of warm water for 5 minutes. Or hold your toe or finger under a faucet of warm running water for 5 minutes.  ? Clean any crust away with soap and water using a cotton swab.  ? Put antibiotic ointment on the infected area.    Change the dressing daily or any time it gets dirty.    If you were given antibiotics, take them as directed until they are all gone.    If your infection is on a toe, wear comfortable shoes with a lot of toe room. You can also wear open-toed sandals while your toe heals.    You may use  over-the-counter medicine (acetaminophen or ibuprofen to help with pain, unless another medicine was prescribed. If you have chronic liver or kidney disease, talk with your healthcare provider before using these medicines. Also talk with your provider if you've had a stomach ulcer or GI (gastrointestinal) bleeding.  Prevention  The following can prevent paronychia:    Avoid cutting or playing with your cuticles at home.    Don't bite your nails.    Don't suck on your thumbs or fingers.  Follow-up care  Follow up with your healthcare provider, or as advised.  When to seek medical advice  Call your healthcare provider right away if any of these occur:    Redness, pain, or swelling of the finger or toe gets worse    Red streaks in the skin leading away from the wound    Pus or fluid draining from the nail area    Fever of 100.4 F (38 C) or higher, or as directed by your provider  Date Last Reviewed: 8/1/2016 2000-2018 [employer]. [address] [place] [address]. All rights reserved. This information is not intended as a substitute for professional medical care. Always follow your healthcare professional's instructions.          24 Hour Appointment Hotline       To make an appointment at any [facility], call [phone] ([phone]). If you don't have a family doctor or clinic, we will help you find one. [place] clinics are conveniently located to serve the needs of you and your family.             Review of your medicines      START taking        Dose / Directions Last dose taken    cephALEXin 500 MG capsule   Commonly known as:  KEFLEX   Dose:  500 mg   Quantity:  28 capsule        Take 1 capsule (500 mg) by mouth 4 times daily for 7 days   Refills:  0          Our records show that you are taking the medicines listed below. If these are incorrect, please call your family doctor or clinic.        Dose / Directions Last dose taken    sertraline 100 MG tablet   Commonly known as:   ZOLOFT   Dose:  200 mg   Quantity:  180 tablet        Take 2 tablets (200 mg) by mouth daily   Refills:  3                Prescriptions were sent or printed at these locations (1 Prescription)                   [facility] [place], [place] - [address] [facility]   [address] [facility] [address]    Telephone:  [phone]   Fax:  [phone]   Hours:                  E-Prescribed (1 of 1)         cephALEXin (KEFLEX) 500 MG capsule                Orders Needing Specimen Collection     None      Pending Results     No orders found from 11/26/2018 to 11/29/2018.            Pending Culture Results     No orders found from 11/26/2018 to 11/29/2018.            Pending Results Instructions     If you had any lab results that were not finalized at the time of your Discharge, you can call the ED Lab Result RN at [phone]. You will be contacted by this team for any positive Lab results or changes in treatment. The nurses are available 7 days a week from 10A to 6:30P.  You can leave a message 24 hours per day and they will return your call.        Test Results From Your Hospital Stay               Thank you for choosing [facility]       Thank you for choosing [facility] for your care. Our goal is always to provide you with excellent care. Hearing back from our patients is one way we can continue to improve our services. Please take a few minutes to complete the written survey that you may receive in the mail after you visit with us. Thank you!        [employer] System[employer]art Information     [facility] gives you secure access to your electronic health record. If you see a primary care provider, you can also send messages to your care team and make appointments. If you have questions, please call your primary care clinic.  If you do not have a primary care provider, please call [phone] and they will assist you.        Care EveryWhere ID     This is your Care EveryWhere ID. This could be used by other organizations to access  your [place] medical records  [id number]        Equal Access to Services     GEOVANNA HOLLOWAY : Arslan Vaughan, remington rivera, zachary rene. So [facility] [phone].    ATENCIÓN: Si habla español, tiene a prieto disposición servicios gratuitos de asistencia lingüística. Llame al [phone].    We comply with applicable federal civil rights laws and [place] laws. We do not discriminate on the basis of race, color, national origin, age, disability, sex, sexual orientation, or gender identity.            After Visit Summary       This is your record. Keep this with you and show to your community pharmacist(s) and doctor(s) at your next visit.

## 2018-11-28 NOTE — ED AVS SNAPSHOT
[facility]    [address] [facility] [address]    Phone:  [phone]    Fax:  [phone]                                       Sofia Garcia   MRN: [id number]    Department:  [facility]   Date of Visit:  11/28/2018           After Visit Summary Signature Page     I have received my discharge instructions, and my questions have been answered. I have discussed any challenges I see with this plan with the nurse or doctor.    ..........................................................................................................................................  Patient/Patient Representative Signature      ..........................................................................................................................................  Patient Representative Print Name and Relationship to Patient    ..................................................               ................................................  Date                                   Time    ..........................................................................................................................................  Reviewed by Signature/Title    ...................................................              ..............................................  Date                                               Time          22EPIC Rev 08/18

## 2018-11-29 NOTE — DISCHARGE INSTRUCTIONS
Warm soapy soaks 3-4 times a day.  Keflex 500 mg 4 times a day for 7 days.  Return for fever, increased redness, swelling, or pain.    Paronychia of the Finger or Toe  Paronychia is an infection near a fingernail or toenail. It usually occurs when an opening in the cuticle or an ingrown toenail lets bacteria under the skin.  The infection will need to be drained if pus is present. If the infection has been caught early, you may need only antibiotic treatment. Healing will take about 1 to 2 weeks.  Home care  Follow these guidelines when caring for yourself at home:    Clean and soak the toe or finger. Do this 2 times a day for the first 3 days. To do so:  ? Soak your foot or hand in a tub of warm water for 5 minutes. Or hold your toe or finger under a faucet of warm running water for 5 minutes.  ? Clean any crust away with soap and water using a cotton swab.  ? Put antibiotic ointment on the infected area.    Change the dressing daily or any time it gets dirty.    If you were given antibiotics, take them as directed until they are all gone.    If your infection is on a toe, wear comfortable shoes with a lot of toe room. You can also wear open-toed sandals while your toe heals.    You may use over-the-counter medicine (acetaminophen or ibuprofen to help with pain, unless another medicine was prescribed. If you have chronic liver or kidney disease, talk with your healthcare provider before using these medicines. Also talk with your provider if you've had a stomach ulcer or GI (gastrointestinal) bleeding.  Prevention  The following can prevent paronychia:    Avoid cutting or playing with your cuticles at home.    Don't bite your nails.    Don't suck on your thumbs or fingers.  Follow-up care  Follow up with your healthcare provider, or as advised.  When to seek medical advice  Call your healthcare provider right away if any of these occur:    Redness, pain, or swelling of the finger or toe gets worse    Red streaks  in the skin leading away from the wound    Pus or fluid draining from the nail area    Fever of 100.4 F (38 C) or higher, or as directed by your provider  Date Last Reviewed: 8/1/2016 2000-2018 [employer]. [address] [place] [address], [place] [address]. All rights reserved. This information is not intended as a substitute for professional medical care. Always follow your healthcare professional's instructions.

## 2018-11-29 NOTE — ED PROVIDER NOTES
History     Chief Complaint   Patient presents with     Wound Infection     right 4th finger     HPI  Sofia Garcia is a 43 year old female who presents to urgent care for evaluation of swelling, redness, and pain in her distal fourth finger.  Patient had noted purulent drainage from the nailbed couple days ago.  Increased pain over the last 24 hours.  Denies fever chills.    Problem List:    Patient Active Problem List    Diagnosis Date Noted     Encounter for insertion of mirena IUD 10/20/2017     Priority: Medium     Lot # [id number] Expiration date 04/20       Excessive or frequent menstruation 05/26/2016     Priority: Medium     Major depression in complete remission (H) 04/18/2016     Priority: Medium     Arthropathy 06/05/2006     Priority: Medium     Problem list name updated by automated process. Provider to review       Tobacco use disorder 09/19/2005     Priority: Medium        Past Medical History:    Past Medical History:   Diagnosis Date     Contact dermatitis and other eczema, due to unspecified cause        Past Surgical History:    No past surgical history on file.    Family History:    Family History   Problem Relation Age of Onset     Cancer Mother      pancreatic     C.A.D. Father      CABG, onset heart disease in late 40s or early 50s     Neuropathy Sister      MS     Cancer Paternal Aunt      liver cancer       Social History:  Marital Status:  Single [1]  Social History   Substance Use Topics     Smoking status: Current Every Day Smoker     Packs/day: 1.00     Years: 20.00     Types: Cigarettes     Smokeless tobacco: Never Used     Alcohol use Yes      Comment: very rarely        No current facility-administered medications on file prior to encounter.   Current Outpatient Prescriptions on File Prior to Encounter:  sertraline (ZOLOFT) 100 MG tablet Take 2 tablets (200 mg) by mouth daily       Review of Systems  As mentioned above in the history present illness. All other systems were reviewed  "and are negative.    Physical Exam   BP: 127/79  Heart Rate: 109  Temp: 98.4  F (36.9  C)  Height: 165.1 cm (5' 5\")  Weight: 86.2 kg (190 lb)  SpO2: 96 %      Physical Exam    GENERAL APPEARANCE: healthy, alert and no distress  Right ring finger: swelling, erythema, and tenderness of the proximal nail skin fold/edge. There is palpable fluctuance and blanching.    ED Course     ED Course     Incision + drainage  Date/Time: 11/28/2018 6:22 PM  Performed by: YUNIEL MANJARREZ  Authorized by: YUNIEL MANJARREZ     Consent:     Consent obtained:  Verbal    Consent given by:  Patient    Risks discussed:  Bleeding and incomplete drainage    Alternatives discussed:  No treatment  Location:     Type:  Abscess    Location:  Upper extremity    Upper extremity location:  Finger    Finger location:  R ring finger  Pre-procedure details:     Skin preparation:  Betadine  Procedure type:     Complexity:  Simple  Procedure details:     Incision types:  Stab incision    Incision depth:  Dermal    Scalpel blade:  11    Wound management:  Irrigated with saline    Drainage:  Purulent    Drainage amount:  Moderate    Wound treatment:  Wound left open  Post-procedure details:     Patient tolerance of procedure:  Tolerated with difficulty                     No results found for this or any previous visit (from the past 24 hour(s)).    Medications - No data to display    Assessments & Plan (with Medical Decision Making)   Paronychial infection with abscess.  I&D as noted above.  Patient was provided an Rx for Keflex.  Instructed to do warm soapy soaks 4 times a day for the next week.  Worrisome reasons to recheck discussed.  I have reviewed the nursing notes.    I have reviewed the findings, diagnosis, plan and need for follow up with the patient.      New Prescriptions    CEPHALEXIN (KEFLEX) 500 MG CAPSULE    Take 1 capsule (500 mg) by mouth 4 times daily for 7 days       Final diagnoses:   Paronychia of toe, right "       11/28/2018   [facility] EMERGENCY DEPARTMENT     Frida Nicole APRN CNP  11/28/18 1854       Frida Nicole APRN CNP  11/28/18 1850

## 2019-03-18 ENCOUNTER — OFFICE VISIT (OUTPATIENT)
Dept: FAMILY MEDICINE | Facility: CLINIC | Age: 44
End: 2019-03-18
Payer: COMMERCIAL

## 2019-03-18 VITALS
OXYGEN SATURATION: 97 % | SYSTOLIC BLOOD PRESSURE: 110 MMHG | TEMPERATURE: 97.2 F | RESPIRATION RATE: 10 BRPM | BODY MASS INDEX: 29.09 KG/M2 | HEIGHT: 66 IN | HEART RATE: 87 BPM | DIASTOLIC BLOOD PRESSURE: 72 MMHG | WEIGHT: 181 LBS

## 2019-03-18 DIAGNOSIS — J01.90 ACUTE NON-RECURRENT SINUSITIS, UNSPECIFIED LOCATION: ICD-10-CM

## 2019-03-18 DIAGNOSIS — F17.200 TOBACCO USE DISORDER: ICD-10-CM

## 2019-03-18 DIAGNOSIS — F32.5 MAJOR DEPRESSION IN COMPLETE REMISSION (H): Primary | ICD-10-CM

## 2019-03-18 PROCEDURE — 99214 OFFICE O/P EST MOD 30 MIN: CPT | Performed by: NURSE PRACTITIONER

## 2019-03-18 RX ORDER — AZITHROMYCIN 250 MG/1
TABLET, FILM COATED ORAL
Qty: 6 TABLET | Refills: 0 | Status: SHIPPED | OUTPATIENT
Start: 2019-03-18 | End: 2019-06-21

## 2019-03-18 RX ORDER — SERTRALINE HYDROCHLORIDE 100 MG/1
200 TABLET, FILM COATED ORAL DAILY
Qty: 180 TABLET | Refills: 3 | Status: SHIPPED | OUTPATIENT
Start: 2019-03-18 | End: 2020-03-19

## 2019-03-18 ASSESSMENT — ANXIETY QUESTIONNAIRES
6. BECOMING EASILY ANNOYED OR IRRITABLE: SEVERAL DAYS
5. BEING SO RESTLESS THAT IT IS HARD TO SIT STILL: NOT AT ALL
1. FEELING NERVOUS, ANXIOUS, OR ON EDGE: SEVERAL DAYS
3. WORRYING TOO MUCH ABOUT DIFFERENT THINGS: NOT AT ALL
IF YOU CHECKED OFF ANY PROBLEMS ON THIS QUESTIONNAIRE, HOW DIFFICULT HAVE THESE PROBLEMS MADE IT FOR YOU TO DO YOUR WORK, TAKE CARE OF THINGS AT HOME, OR GET ALONG WITH OTHER PEOPLE: NOT DIFFICULT AT ALL
7. FEELING AFRAID AS IF SOMETHING AWFUL MIGHT HAPPEN: NOT AT ALL
GAD7 TOTAL SCORE: 2
2. NOT BEING ABLE TO STOP OR CONTROL WORRYING: NOT AT ALL

## 2019-03-18 ASSESSMENT — PATIENT HEALTH QUESTIONNAIRE - PHQ9
5. POOR APPETITE OR OVEREATING: NOT AT ALL
SUM OF ALL RESPONSES TO PHQ QUESTIONS 1-9: 2

## 2019-03-18 ASSESSMENT — MIFFLIN-ST. JEOR: SCORE: 1500.01

## 2019-03-18 NOTE — PATIENT INSTRUCTIONS
1. Take Vitamin D supplement daiily - 7900-8412 units per day          Thank you for choosing [facility].  You may be receiving an email and/or telephone survey request from [facility] Customer Experience regarding your visit today.  Please take a few minutes to respond to the survey to let us know how we are doing.      If you have questions or concerns, please contact us via [employer] or you can contact your care team at [phone].    Our Clinic hours are:  Monday 6:40 am  to 7:00 pm  Tuesday -Friday 6:40 am to 5:00 pm    The [place] outpatient lab hours are:  Monday - Friday 6:10 am to 4:45 pm  Saturdays 7:00 am to 11:00 am  Appointments are required, call [phone]    If you have clinical questions after hours or would like to schedule an appointment,  call the clinic at [phone].

## 2019-03-18 NOTE — PROGRESS NOTES
SUBJECTIVE:   Sofia Garcia is a 44 year old female who presents to clinic today for the following health issues:      Depression Followup    Status since last visit: Stable     See PHQ-9 for current symptoms.      Complicating factors:   Significant life event:  No   Current substance abuse:  None  Anxiety or Panic symptoms:  Yes-  anxiety    PHQ-9 SCORE 4/17/2017 4/2/2018 3/18/2019   PHQ-9 Total Score 2 3 2     BRENDEN-7 SCORE 4/17/2017 4/2/2018 3/18/2019   Total Score 0 1 2       In the past two weeks have you had thoughts of suicide or self-harm?  No.    Do you have concerns about your personal safety or the safety of others?   No  PHQ-9  English  PHQ-9   Any Language  Suicide Assessment Five-step Evaluation and Treatment (SAFE-T)    Amount of exercise or physical activity: None    Problems taking medications regularly: No    Medication side effects: none    Diet: regular (no restrictions)        Acute Illness   Acute illness concerns: Cough  Onset: Last Thursday    Fever: no    Chills/Sweats: YES- Chills    Headache (location?): YES    Sinus Pressure:YES    Conjunctivitis:  YES: both    Ear Pain: YES: both    Rhinorrhea: YES    Congestion: YES- a little    Sore Throat: no     Cough: YES    Wheeze: no    Decreased Appetite: YES    Nausea: no    Vomiting: no    Diarrhea:  no    Dysuria/Freq.: no    Fatigue/Achiness: YES    Sick/Strep Exposure: no    Patient is a smoker.      Therapies Tried and outcome: na      Problem list and histories reviewed & adjusted, as indicated.  Additional history: as documented    Patient Active Problem List   Diagnosis     Tobacco use disorder     Arthropathy     Major depression in complete remission (H)     Excessive or frequent menstruation     Encounter for insertion of mirena IUD     History reviewed. No pertinent surgical history.    Social History     Tobacco Use     Smoking status: Current Every Day Smoker     Packs/day: 1.00     Years: 20.00     Pack years: 20.00     Types:  "Cigarettes     Smokeless tobacco: Never Used   Substance Use Topics     Alcohol use: Yes     Comment: very rarely     Family History   Problem Relation Age of Onset     Cancer Mother         pancreatic     C.A.D. Father         CABG, onset heart disease in late 40s or early 50s     Neuropathy Sister         MS     Cancer Paternal Aunt         liver cancer           Reviewed and updated as needed this visit by clinical staff       Reviewed and updated as needed this visit by Provider         ROS:  Constitutional, HEENT, cardiovascular, pulmonary, GI, , musculoskeletal, neuro, skin, endocrine and psych systems are negative, except as otherwise noted.    OBJECTIVE:     /72   Pulse 87   Temp 97.2  F (36.2  C) (Tympanic)   Resp 10   Ht 1.68 m (5' 6.14\")   Wt 82.1 kg (181 lb)   SpO2 97%   Breastfeeding? No   BMI 29.09 kg/m    Body mass index is 29.09 kg/m .  GENERAL: healthy, alert and no distress  HENT: normal cephalic/atraumatic, ear canals and TM's normal, nose and mouth without ulcers or lesions, oropharynx clear, oral mucous membranes moist and sinuses: maxillary, frontal tenderness on bilateral  NECK: no adenopathy, no asymmetry, masses, or scars and thyroid normal to palpation  RESP: lungs clear to auscultation - no rales, rhonchi or wheezes  CV: regular rate and rhythm, normal S1 S2, no S3 or S4, no murmur, click or rub, no peripheral edema and peripheral pulses strong  MS: no gross musculoskeletal defects noted, no edema  PSYCH: mentation appears normal, affect normal/bright    Diagnostic Test Results:  none     ASSESSMENT/PLAN:       1. Major depression in complete remission (H)  Well controlled  - sertraline (ZOLOFT) 100 MG tablet; Take 2 tablets (200 mg) by mouth daily  Dispense: 180 tablet; Refill: 3    2. Tobacco use disorder  Encouraged patient to quit smoking    3. Acute non-recurrent sinusitis, unspecified location  Will treat patient due to symptoms and tobacco use- increase risk for " development of respiratory infections   - azithromycin (ZITHROMAX Z-BRE) 250 MG tablet; Take 2 tablets on day 1 and then 1 tablet on days 2-5.  Dispense: 6 tablet; Refill: 0        BRANNON Cerda [facility] [place]

## 2019-03-19 ASSESSMENT — ANXIETY QUESTIONNAIRES: GAD7 TOTAL SCORE: 2

## 2019-06-20 PROBLEM — E55.9 HYPOVITAMINOSIS D: Status: ACTIVE | Noted: 2019-06-20

## 2019-06-20 PROBLEM — M06.9 RA (RHEUMATOID ARTHRITIS) (H): Status: ACTIVE | Noted: 2019-06-20

## 2019-06-20 NOTE — PROGRESS NOTES
SUBJECTIVE:                                                    Sofia Garcia is 44 year old female   Chief Complaint   Patient presents with     TMJ     Symptoms 3 weeks, states no injury/incident. States jaw pain, clicking, and catching. causing right sided neck, ear low back pain. Increased pain with hard to eat foods. Has tried IBU to little effect. Was evaluated by a chiropractor, unhelpful.       Problem list and histories reviewed & adjusted, as indicated.  Additional history: as documented    Patient Active Problem List   Diagnosis     Tobacco use disorder     Arthropathy     Major depression in complete remission (H)     Excessive or frequent menstruation     Encounter for insertion of mirena IUD     RA (rheumatoid arthritis) (H)     Hypovitaminosis D     Anxiety state     History reviewed. No pertinent surgical history.    Social History     Tobacco Use     Smoking status: Current Every Day Smoker     Packs/day: 1.00     Years: 20.00     Pack years: 20.00     Types: Cigarettes     Smokeless tobacco: Never Used   Substance Use Topics     Alcohol use: Yes     Comment: very rarely     Family History   Problem Relation Age of Onset     Cancer Mother         pancreatic     C.A.D. Father         CABG, onset heart disease in late 40s or early 50s     Neuropathy Sister         MS     Cancer Paternal Aunt         liver cancer         Current Outpatient Medications   Medication Sig Dispense Refill     sertraline (ZOLOFT) 100 MG tablet Take 2 tablets (200 mg) by mouth daily 180 tablet 3     azithromycin (ZITHROMAX Z-BRE) 250 MG tablet Take 2 tablets on day 1 and then 1 tablet on days 2-5. (Patient not taking: Reported on 6/21/2019) 6 tablet 0     No Known Allergies  No lab results found.   BP Readings from Last 3 Encounters:   06/21/19 102/68   03/18/19 110/72   11/28/18 127/79    Wt Readings from Last 3 Encounters:   06/21/19 76 kg (167 lb 9.6 oz)   03/18/19 82.1 kg (181 lb)   11/28/18 86.2 kg (190 lb)     "     ROS:  Constitutional, HEENT, cardiovascular, pulmonary, gi and gu systems are negative, except as otherwise noted.    OBJECTIVE:                                                    /68   Pulse 86   Temp 98.8  F (37.1  C) (Tympanic)   Resp 12   Ht 1.68 m (5' 6.14\")   Wt 76 kg (167 lb 9.6 oz)   SpO2 98%   BMI 26.94 kg/m    GENERAL APPEARANCE ADULT: Alert, no acute distress  HENT: Ears and TMs normal, oral mucosa and posterior oropharynx normal.  Right tmj tender, lower jaw deviates to right  PSYCH: mentation appears normal., affect and mood normal  Diagnostic Test Results:  none      ASSESSMENT/PLAN:                                                    1. TMJ (temporomandibular joint syndrome)  Isometric and tongue outlining teeth exercizes, muscle relaxant, stress reduction habits.  - cyclobenzaprine (FLEXERIL) 10 MG tablet; Take 1 tablet (10 mg) by mouth 3 times daily as needed for muscle spasms  Dispense: 30 tablet; Refill: 3    2. Major depression in complete remission (H)  Happy with current medication, not interested in change.  - DEPRESSION ACTION PLAN (DAP)        America Manning MD  [facility]    "

## 2019-06-21 ENCOUNTER — OFFICE VISIT (OUTPATIENT)
Dept: FAMILY MEDICINE | Facility: CLINIC | Age: 44
End: 2019-06-21
Payer: COMMERCIAL

## 2019-06-21 VITALS
HEART RATE: 86 BPM | HEIGHT: 66 IN | WEIGHT: 167.6 LBS | RESPIRATION RATE: 12 BRPM | BODY MASS INDEX: 26.93 KG/M2 | OXYGEN SATURATION: 98 % | DIASTOLIC BLOOD PRESSURE: 68 MMHG | TEMPERATURE: 98.8 F | SYSTOLIC BLOOD PRESSURE: 102 MMHG

## 2019-06-21 DIAGNOSIS — M26.609 TMJ (TEMPOROMANDIBULAR JOINT SYNDROME): Primary | ICD-10-CM

## 2019-06-21 DIAGNOSIS — F32.5 MAJOR DEPRESSION IN COMPLETE REMISSION (H): ICD-10-CM

## 2019-06-21 PROCEDURE — 99214 OFFICE O/P EST MOD 30 MIN: CPT | Performed by: FAMILY MEDICINE

## 2019-06-21 RX ORDER — CYCLOBENZAPRINE HCL 10 MG
10 TABLET ORAL 3 TIMES DAILY PRN
Qty: 30 TABLET | Refills: 3 | Status: SHIPPED | OUTPATIENT
Start: 2019-06-21 | End: 2020-11-23

## 2019-06-21 ASSESSMENT — MIFFLIN-ST. JEOR: SCORE: 1439.23

## 2019-06-21 NOTE — NURSING NOTE
"Initial /68   Pulse 86   Temp 98.8  F (37.1  C) (Tympanic)   Resp 12   Ht 1.68 m (5' 6.14\")   Wt 76 kg (167 lb 9.6 oz)   SpO2 98%   BMI 26.94 kg/m   Estimated body mass index is 26.94 kg/m  as calculated from the following:    Height as of this encounter: 1.68 m (5' 6.14\").    Weight as of this encounter: 76 kg (167 lb 9.6 oz). .      "

## 2019-06-21 NOTE — PATIENT INSTRUCTIONS
Scheduled over the counter pain meds.  Aleve or Advil not both they are the same class of medication.    Naproxen 500 mg orally every 12 hours scheduled for 10 days and/or   Tylenol 650 mg two orally every 12 hours scheduled for 10 days       Can take additional 1300 mg of tylenol or 500 mg naproxen in a 24 hour time     Taking pain medication on a schedule will help to get ahead of the pain.  You are not waiting for the pain to take the medicine.  An example of scheduled routine for every 6 hours is to take 600 mg of ibuprofen at 6 am, 12 pm, 6 pm, 12 am. If this is not working to manage your pain add Tylenol 1300 mg at 6 am and 6 pm.      If you get good pain relief and it returns when stopped consider using these medications longer.  Risks from long term use of ibuprofen and tylenol  are minimal.  Ibuprofen long term can give you stomach inflammation and stomach ulcers, taking with food helps prevent this.  Consuming tylenol when using alcohol regularily can stress the liver.      Maximum dose of naproxen is 1250 mg a day, may [id number] mg daily for limited time.  Maximum dose of Tylenol is 4000 mg a day.  Those with kidney or liver disease need to use less or none at all.   It is safe to use both Tylenol and ibuprofen together scheduled.  Expect the pain relief effect to be more than additive.    Remember that the narcotic pain medication you were prescribed may have ibuprofen or acetaminophen in it and that must be counted into the total amount of either for the 24 hours.      Patient Education     TMJ Syndrome  The temporomandibular joint (TMJ) is the joint that connects your lower jaw to your head. You can feel it in front of your ears when you open and close your mouth. TMJ disorders involve chronic or recurrent pain in the joint. When treated, symptoms of TMJ disorders usually go away within a few months.  Causes  There is no widely agreed-on cause of TMJ disorders. They have been linked to injury, arthritis,  chronic fatigue syndrome, and fibromyalgia. A definite connection has not been shown, though.  Symptoms    Pain in the face, jaw, or neck    Pain with jaw movement or chewing    Locking or catching sensation of the jaw    Clicking, popping, or grinding sounds with movement of the TMJ    Headache    Ear pain  Home care  Modest, nonsurgical treatments are a good first step toward relieving symptoms. Try the approaches described below.    Rest the jaw by avoiding crunchy or hard-to-chew foods. Don t eat hard or sticky candies. Soft foods and liquids are easier on the jaw.    Protect your jaw while yawning. If you need to yawn, put your fist under your chin to prevent your mouth from opening up too wide.    To help relieve pain, try applying hot or cold packs to the painful area. Try both hot and cold to find out which works best for you. To make a cold pack, put ice cubes in a plastic bag that seals at the top. Wrap the bag in a clean, thin towel or cloth. Never put ice or an ice pack directly on the skin. If you use hot packs (small towels soaked in hot water), be careful not to burn yourself.    You may take acetaminophen or ibuprofen for pain, unless you were given a different pain medicine. (Note: If you have chronic liver or kidney disease or have ever had a stomach ulcer or gastrointestinal bleeding, talk with your healthcare provider before using these medicines. Also talk to your provider if you are taking medicine to prevent blood clots.) Don t give aspirin to a child younger than age 19 unless directed by the child s provider. Taking aspirin can put a child at risk for Reye syndrome. This is a rare but very serious disorder that most often affects the brain and the liver.  Reducing stress  If stress seems to be contributing to your symptoms, try to identify the sources of stress in your life. These aren t always obvious. Common stressors include:    Everyday hassles. These include things such as traffic jams,  missed appointments, or car trouble.    Major life changes. These can be good, such as a new baby or job promotion. And they can be bad, such as losing a job or losing a loved one.    Overload. The feeling that you have too many responsibilities and can't take care of everything at once.    Helplessness. Feeling like your problems are more than you can solve.  When possible, do something about your sources of stress. See if you can avoid hassles, limit the amount of change in your life at one time, and take breaks when you feel overloaded.  Unfortunately, many stressful situations cannot be avoided. So learning how to manage stress better is very important. Getting regular exercise, eating nutritious, balanced meals, and getting adequate rest all help to make everyday stress more manageable. Certain techniques are also helpful: relaxation and breathing exercises, visualization, biofeedback, meditation, or simply taking some time out to clear your mind. For more information, talk with your healthcare provider.  Follow-up care  Follow up with your healthcare provider, or as advised. Further testing and additional treatment may be required. If changes to your lifestyle do not improve your symptoms, talk with your healthcare provider about other available therapies. These include bite guards for help with teeth grinding, stress management techniques, and more. If stress is an important factor and does not respond to the above simple measures, talk with your healthcare provider about a referral for stress management.  If X-rays were done, they will be reviewed by a specialist. You will be notified of the results, especially if they affect treatment.  Call 911  Call 911 if any of these occur:    Trouble breathing or swallowing, wheezing    Confusion    Extreme drowsiness or trouble awakening    Fainting or loss of consciousness    Rapid heart rate  When to seek medical advice  Call your healthcare provider right away if  any of these occur:    Swollen or red face    Pain gets worse    Neck, mouth, tooth, or throat pain gets worse    Fever of 100.4 F (38 C) or higher, or as directed by your healthcare provider  Date Last Reviewed: 10/1/2017    [id number] [employer]. [address] [place] [address]. All rights reserved. This information is not intended as a substitute for professional medical care. Always follow your healthcare professional's instructions.           Patient Education     Self-Care for Temporomandibular Disorders (TMD)    You have temporomandibular disorder (TMD). This term describes a group of problems related to the temporomandibular joint (TMJ) and nearby muscles. The TMJ is located where the upper and lower jaws meet. Treatment will get your jaw back to normal function. But your care doesn t end there. Once you ve had TMD, it s important to avoid reinjury. Get in the habit of doing self-checks. This can make you aware of any symptoms that begin to return, so you can take action right away.  Doing self-checks  Make it a habit to assess your body a few times each day. Try writing yourself a reminder. Or set an alarm on your watch or computer. When doing a self-check, ask yourself:    Do I feel stressed?    Are my muscles tense?    Am I grinding or clenching my teeth?    Is my posture healthy for my body?    Is there anything I can do to make myself more comfortable?  If you answer yes to any of the questions above, you need to take action. Changing your posture or taking a short break can help prevent or relieve TMD symptoms.  Listening to your body  Many people get used to ignoring pain. But pain is a signal that your body needs care. To maintain your TMJ health:    Don t eat hard or chewy foods. Even if you feel fine, eating such foods can trigger symptoms again.    Be aware of your body. Don t ignore TMD symptoms. The nagging pain in your neck or jaw may be a sign that you need  care.    Keep follow-up appointments. Be sure to keep all appointments with your healthcare team.                                                                                                                                Managing stress  Stress is a key factor in TMD. Stress can make you clench your muscles or grind your teeth. It can also affect your sleep, reducing your body s ability to heal. Here are a few tips to manage stress:    Learn ways to relax. Try listening to music or gently stretching. Take a few slow deep breaths. Or, close your eyes and imagine a place or object that is calming.    Get plenty of rest and sleep.    Set goals you know you can attain.    Make time for people and things you enjoy.    Ask for help if you need it. Friends and family can run errands and cook meals for you.   Staying active  Activity helps the body in many ways. You stay looser and more relaxed. It also helps keep muscles and tissues conditioned. That way you can heal faster and make reinjury less likely. Here are some tips to get you started:    Talk with your healthcare provider before starting an exercise program.    Always warm up and stretch before each activity. This helps prevent injury.    Try walking or swimming. These activities are easy on your joints. They also benefit your heart and lungs.    Try yoga or hernesto chi. These are relaxing activities known for reducing stress.  Date Last Reviewed: 8/1/2017 2000-2018 [employer]. [address] [facility], [place] [address]. All rights reserved. This information is not intended as a substitute for professional medical care. Always follow your healthcare professional's instructions.

## 2019-07-28 ENCOUNTER — HOSPITAL ENCOUNTER (EMERGENCY)
Facility: CLINIC | Age: 44
Discharge: HOME | End: 2019-07-28
Attending: EMERGENCY MEDICINE | Admitting: EMERGENCY MEDICINE
Payer: COMMERCIAL

## 2019-07-28 VITALS
OXYGEN SATURATION: 97 % | HEART RATE: 98 BPM | WEIGHT: 170 LBS | HEIGHT: 64 IN | BODY MASS INDEX: 29.02 KG/M2 | DIASTOLIC BLOOD PRESSURE: 78 MMHG | TEMPERATURE: 98.1 F | SYSTOLIC BLOOD PRESSURE: 115 MMHG | RESPIRATION RATE: 16 BRPM

## 2019-07-28 DIAGNOSIS — L50.9 HIVES: ICD-10-CM

## 2019-07-28 DIAGNOSIS — T78.40XA ALLERGIC REACTION, INITIAL ENCOUNTER: ICD-10-CM

## 2019-07-28 PROCEDURE — 99284 EMERGENCY DEPT VISIT MOD MDM: CPT | Mod: 25 | Performed by: EMERGENCY MEDICINE

## 2019-07-28 PROCEDURE — 96374 THER/PROPH/DIAG INJ IV PUSH: CPT | Performed by: EMERGENCY MEDICINE

## 2019-07-28 PROCEDURE — 99284 EMERGENCY DEPT VISIT MOD MDM: CPT | Mod: Z6 | Performed by: EMERGENCY MEDICINE

## 2019-07-28 PROCEDURE — 96375 TX/PRO/DX INJ NEW DRUG ADDON: CPT | Performed by: EMERGENCY MEDICINE

## 2019-07-28 PROCEDURE — 25000128 H RX IP 250 OP 636: Performed by: EMERGENCY MEDICINE

## 2019-07-28 RX ORDER — PREDNISONE 20 MG/1
TABLET ORAL
Qty: 10 TABLET | Refills: 0 | Status: SHIPPED | OUTPATIENT
Start: 2019-07-28 | End: 2019-09-13

## 2019-07-28 RX ORDER — PREDNISONE 20 MG/1
TABLET ORAL
Qty: 10 TABLET | Refills: 0 | Status: SHIPPED | OUTPATIENT
Start: 2019-07-28 | End: 2019-07-28

## 2019-07-28 RX ORDER — DIPHENHYDRAMINE HYDROCHLORIDE 50 MG/ML
25 INJECTION INTRAMUSCULAR; INTRAVENOUS ONCE
Status: COMPLETED | OUTPATIENT
Start: 2019-07-28 | End: 2019-07-28

## 2019-07-28 RX ORDER — METHYLPREDNISOLONE SODIUM SUCCINATE 125 MG/2ML
125 INJECTION, POWDER, LYOPHILIZED, FOR SOLUTION INTRAMUSCULAR; INTRAVENOUS ONCE
Status: COMPLETED | OUTPATIENT
Start: 2019-07-28 | End: 2019-07-28

## 2019-07-28 RX ORDER — IBUPROFEN 200 MG
400 TABLET ORAL EVERY 4 HOURS PRN
COMMUNITY
End: 2022-04-05 | Stop reason: SINTOL

## 2019-07-28 RX ADMIN — DIPHENHYDRAMINE HYDROCHLORIDE 25 MG: 50 INJECTION, SOLUTION INTRAMUSCULAR; INTRAVENOUS at 13:37

## 2019-07-28 RX ADMIN — METHYLPREDNISOLONE SODIUM SUCCINATE 125 MG: 125 INJECTION, POWDER, FOR SOLUTION INTRAMUSCULAR; INTRAVENOUS at 13:38

## 2019-07-28 ASSESSMENT — MIFFLIN-ST. JEOR: SCORE: 1416.11

## 2019-07-28 NOTE — ED NOTES
Hives started yesterday on arms-woke at 0400 itching and hives were generalized-took benadryl 25mg po and took another 50 mg at 0900 with no change-denies difficulty breathing or swallowing-lungs clear

## 2019-07-28 NOTE — ED AVS SNAPSHOT
[facility] Emergency Department  [address] [facility] [address]  Phone:  [phone]  Fax:  [phone]                                    Sofia Garcia   MRN: [id number]    Department:  [facility] Emergency Department   Date of Visit:  7/28/2019           After Visit Summary Signature Page    I have received my discharge instructions, and my questions have been answered. I have discussed any challenges I see with this plan with the nurse or doctor.    ..........................................................................................................................................  Patient/Patient Representative Signature      ..........................................................................................................................................  Patient Representative Print Name and Relationship to Patient    ..................................................               ................................................  Date                                   Time    ..........................................................................................................................................  Reviewed by Signature/Title    ...................................................              ..............................................  Date                                               Time          22EPIC Rev 08/18

## 2019-07-28 NOTE — DISCHARGE INSTRUCTIONS
Return if symptoms worsen or new symptoms develop.  Follow-up with primary care physician next available.  Drink plenty of fluids.  Take prednisone as directed.  Take Benadryl 25 to 50 mg p.o. as needed.  Take Zantac or Pepcid as directed for the next few days.  If increased itching hives any shortness of breath throat swelling or other symptoms please immediately return for valuation and care.

## 2019-07-28 NOTE — ED PROVIDER NOTES
History     Chief Complaint   Patient presents with     Hives     started yesterday     HPI  Sofia Garcia is a 44 year old female with history anxiety state, rheumatoid arthritis, major depression (in remission), tobacco use disorder, eczema who presents to the ED with hives. The patient reports an onset of hives all over her body that started yesterday. The hives are located on her abdomen, extremities, neck, and back. She denies any recent changes in diet, medication or any exposures. The patient reports she is most itchy on the back of her head and neck, but feels itchy all over. She denies shortness of breath, tightness in her throat, abnormal cough, and fever. The patient reports a history of episodes of hives that are stress related. She also notes a history of a rash during the the last week of July and beginning of August for the past few moncada. She reports she has gotten the rash looked at 3 times before but the cause was never determined. She reports this episode is much worse than anything in the past and the rash typically does not present as hives. The patient has eczema which typically presents behind right ear. The patient notes a history of shingles. The patient reports prednisone typically makes her feel emotional, but she is willing to take it to relieve hives.  She denies any headaches.  She has not had visual changes.  She has not had any urinary symptoms.  She denies any bowel or bladder dysfunction.  Allergies:  No Known Allergies    Problem List:    Patient Active Problem List    Diagnosis Date Noted     RA (rheumatoid arthritis) (H) 06/20/2019     Priority: Medium     Hypovitaminosis D 06/20/2019     Priority: Medium     Encounter for insertion of mirena IUD 10/20/2017     Priority: Medium     Lot # [id number] Expiration date 04/20       Excessive or frequent menstruation 05/26/2016     Priority: Medium     Major depression in complete remission (H) 04/18/2016     Priority: Medium      "Anxiety state 04/02/2007     Priority: Medium     Overview:   Epic        Arthropathy 06/05/2006     Priority: Medium     Problem list name updated by automated process. Provider to review       Tobacco use disorder 09/19/2005     Priority: Medium        Past Medical History:    Past Medical History:   Diagnosis Date     Contact dermatitis and other eczema, due to unspecified cause        Past Surgical History:    No past surgical history on file.    Family History:    Family History   Problem Relation Age of Onset     Cancer Mother         pancreatic     C.A.D. Father         CABG, onset heart disease in late 40s or early 50s     Neuropathy Sister         MS     Cancer Paternal Aunt         liver cancer       Social History:  Marital Status:  Single [1]  Social History     Tobacco Use     Smoking status: Current Every Day Smoker     Packs/day: 1.00     Years: 20.00     Pack years: 20.00     Types: Cigarettes     Smokeless tobacco: Never Used   Substance Use Topics     Alcohol use: Yes     Comment: very rarely     Drug use: No        Medications:      cyclobenzaprine (FLEXERIL) 10 MG tablet   sertraline (ZOLOFT) 100 MG tablet     ROS  All systems reviewed and other than pertinent positives and negatives in HPI all other systems are negative.  Physical Exam   BP: 115/78  Pulse: 98  Temp: 98.1  F (36.7  C)  Resp: 16  Height: 162.6 cm (5' 4\")  Weight: 77.1 kg (170 lb)  SpO2: 97 %      Physical Exam   Constitutional: She appears well-developed and well-nourished.   Eyes: Conjunctivae are normal.   Psychiatric: She has a normal mood and affect.   Nursing note and vitals reviewed.     HENT: Oral mucosa moist. No lesions.  Posterior pharynx without significant erythema edema.  Neck: Supple no JVD.  Pulmonary/Chest: Lungs are clear to auscultation bilaterally.  Cardiovascular: Heart is regular rate and rhythm. No murmur.  Abdomen: Soft, non-distended, non-tender.   Musculoskeletal: No midline back pain moving all " extremities well. No peripheral edema.  There is no calf tenderness.  Neurological: Alert. No focal neurologic deficit.   Skin: urticaria rash noted on patients extremities, neck, back, and abdomen.     ED Course        Procedures               Critical Care time:  none               No results found for this or any previous visit (from the past 24 hour(s)).    Medications   diphenhydrAMINE (BENADRYL) injection 25 mg (25 mg Intravenous Given 7/28/19 1337)   methylPREDNISolone sodium succinate (solu-MEDROL) injection 125 mg (125 mg Intravenous Given 7/28/19 1338)        12:50 PM Patient Assessed.    Assessments & Plan (with Medical Decision Making) records were reviewed.  Labs were obtained.  Patient was given Solu-Medrol 125 mg IV along with Benadryl 25 mg IV.  She was observed.  She had improvement of the rash and continued to have no respiratory difficulties or throat swelling.  Findings were discussed with patient.  This appears to be an urticarial rash secondary to an allergic reaction.  She will be given prednisone for the next few days and should take Benadryl as needed.  If increased shortness of breath throat swelling or any other symptoms present she should return for further evaluation and care.  Patient is comfortable with this plan.     I have reviewed the nursing notes.    I have reviewed the findings, diagnosis, plan and need for follow up with the patient.          Medication List      Started    predniSONE 20 MG tablet  Commonly known as:  DELTASONE  Take two tablets (= 40mg) each day for 5 (five) days            Final diagnoses:   Allergic reaction, initial encounter   Hives     This document serves as a record of the services and decisions personally performed and made by Mitch Mendoza MD. It was created on HIS/HER behalf by   Leonor Ace, a trained medical scribe. The creation of this document is based the provider's statements to the medical scribe.  Leonor Ace 12:39 PM  7/28/2019    Provider:   The information in this document, created by the medical scribe for me, accurately reflects the services I personally performed and the decisions made by me. I have reviewed and approved this document for accuracy prior to leaving the patient care area.  Mitch Mendoza MD 12:39 PM 7/28/2019 7/28/2019   [facility] EMERGENCY DEPARTMENT     Mitch Mendoza MD  07/30/19 1905

## 2019-07-29 ENCOUNTER — HOSPITAL ENCOUNTER (EMERGENCY)
Facility: CLINIC | Age: 44
Discharge: LEFT BEFORE DISCHARGE | End: 2019-07-29
Payer: COMMERCIAL

## 2019-07-29 ENCOUNTER — HOSPITAL ENCOUNTER (EMERGENCY)
Facility: CLINIC | Age: 44
Discharge: HOME | End: 2019-07-29
Attending: EMERGENCY MEDICINE | Admitting: EMERGENCY MEDICINE
Payer: COMMERCIAL

## 2019-07-29 VITALS
HEIGHT: 64 IN | OXYGEN SATURATION: 100 % | TEMPERATURE: 98.1 F | DIASTOLIC BLOOD PRESSURE: 86 MMHG | HEART RATE: 94 BPM | WEIGHT: 170 LBS | BODY MASS INDEX: 29.02 KG/M2 | SYSTOLIC BLOOD PRESSURE: 124 MMHG

## 2019-07-29 VITALS
TEMPERATURE: 98.1 F | RESPIRATION RATE: 16 BRPM | DIASTOLIC BLOOD PRESSURE: 79 MMHG | OXYGEN SATURATION: 98 % | WEIGHT: 170 LBS | HEART RATE: 113 BPM | SYSTOLIC BLOOD PRESSURE: 118 MMHG | HEIGHT: 64 IN | BODY MASS INDEX: 29.02 KG/M2

## 2019-07-29 DIAGNOSIS — L29.9 ITCHING: ICD-10-CM

## 2019-07-29 DIAGNOSIS — R22.0 FACIAL SWELLING: ICD-10-CM

## 2019-07-29 PROCEDURE — 25000132 ZZH RX MED GY IP 250 OP 250 PS 637: Performed by: EMERGENCY MEDICINE

## 2019-07-29 PROCEDURE — 99283 EMERGENCY DEPT VISIT LOW MDM: CPT | Mod: Z6 | Performed by: EMERGENCY MEDICINE

## 2019-07-29 PROCEDURE — 99283 EMERGENCY DEPT VISIT LOW MDM: CPT

## 2019-07-29 PROCEDURE — 83520 IMMUNOASSAY QUANT NOS NONAB: CPT | Performed by: EMERGENCY MEDICINE

## 2019-07-29 RX ORDER — HYDROXYZINE HYDROCHLORIDE 10 MG/1
10 TABLET, FILM COATED ORAL 3 TIMES DAILY PRN
Qty: 7 TABLET | Refills: 0 | Status: SHIPPED | OUTPATIENT
Start: 2019-07-29 | End: 2019-08-02

## 2019-07-29 RX ORDER — CETIRIZINE HYDROCHLORIDE 10 MG/1
10 TABLET ORAL ONCE
Status: COMPLETED | OUTPATIENT
Start: 2019-07-29 | End: 2019-07-29

## 2019-07-29 RX ADMIN — CETIRIZINE HYDROCHLORIDE 10 MG: 10 TABLET, FILM COATED ORAL at 18:31

## 2019-07-29 ASSESSMENT — MIFFLIN-ST. JEOR
SCORE: 1416.11
SCORE: 1416.11

## 2019-07-29 ASSESSMENT — ENCOUNTER SYMPTOMS
MUSCULOSKELETAL NEGATIVE: 1
EYES NEGATIVE: 1
RESPIRATORY NEGATIVE: 1
FACIAL SWELLING: 1
PSYCHIATRIC NEGATIVE: 1
CONSTITUTIONAL NEGATIVE: 1
CARDIOVASCULAR NEGATIVE: 1
GASTROINTESTINAL NEGATIVE: 1
NEUROLOGICAL NEGATIVE: 1
HEMATOLOGIC/LYMPHATIC NEGATIVE: 1
ENDOCRINE NEGATIVE: 1
ALLERGIC/IMMUNOLOGIC NEGATIVE: 1

## 2019-07-29 NOTE — ED PROVIDER NOTES
History     Chief Complaint   Patient presents with     Allergic Reaction     was seen yesterday for this and was given prednisone and benadryl, took benadryl, prednisone and zantac today, lip and eye swelling started today. not having any breathing difficulties     HPI  Sofia Garcia is a 44 year old female with history of anxiety, contact dermatitis, eczema who presents to the emergency department complaining of hives, lip swelling and eye lids swelling. Patient has history of hives and swelling in summer months or during stressful situations/times. . For the last 4 years episodes have occurred during the last weekend of July and the first week of August. Typically her symptoms are localized below her waist but she does have history of facial swelling. She adds that her symptoms typically persist for a few weeks. She has been evaluated by a dermatolgist in the past but no root cause was identified, but adds that she does have sensitive skin.  Patient was advised in the department on July 28 and treated with IV steroids, Benadryl discharged home with prednisone orally.    . She states that she first noticed a bite near her right elbow similar to a mosquito bite, she then noticed hive on her left elbow,spread to her abdomen, She awoke around 4am the next morning and saw that her hives had spread over her entire stomach.  Patient is been compliant with her prescription provided after ED visit on July 28.  She did also take Zantac.  She feels the intensity of her symptoms today is more pronounced than prior episodes in the last 3 to 4 years.    Prior to arrival today, prednisone (40mg) at 5:30 am and went to work, she noted  facial swelling, eye lid and lip swelling and came in to the ED for further evaluation. She was told that the potential cause could be that she did not take Benadryl today alongside her prescribed Prednisone. The patient reports that she then went home and took Benadryl around 10:30 or 11:30  am and took a nap. She woke up with increasing facial swelling and presents for reevaluation. Patient is concerned she may have a reaction to benadryl. Boyfriend (Stephan) notes that her facial swelling has decreased significantly upon arrival.  Patient predominantly complains about itching around her head and face.  No difficulty breathing, no tongue swelling, no trouble swallowing or shortness of breath.    Social History: Patient lives in [facility], she arrived by personal vehicle accompanied by her boyfriend Stephan. Current-smoker, 1 pack/day for 20 years.    Past Medical History:  Past Medical History:   Diagnosis Date     Contact dermatitis and other eczema, due to unspecified cause     hands       Medications:  Current Outpatient Medications   Medication Sig Dispense Refill     hydrOXYzine (ATARAX) 10 MG tablet Take 1 tablet (10 mg) by mouth 3 times daily as needed for itching 7 tablet 0     cyclobenzaprine (FLEXERIL) 10 MG tablet Take 1 tablet (10 mg) by mouth 3 times daily as needed for muscle spasms 30 tablet 3     ibuprofen (ADVIL/MOTRIN) 200 MG tablet Take 400 mg by mouth every 4 hours as needed for mild pain       predniSONE (DELTASONE) 20 MG tablet Take two tablets (= 40mg) each day for 5 (five) days 10 tablet 0     sertraline (ZOLOFT) 100 MG tablet Take 2 tablets (200 mg) by mouth daily (Patient taking differently: Take 200 mg by mouth At Bedtime ) 180 tablet 3       Allergies:  No known drug allergies.    I have reviewed the Medications, Allergies, Past Medical and Surgical History, and Social History in the Epic system.    Problem List:    Patient Active Problem List    Diagnosis Date Noted     RA (rheumatoid arthritis) (H) 06/20/2019     Priority: Medium     Hypovitaminosis D 06/20/2019     Priority: Medium     Encounter for insertion of mirena IUD 10/20/2017     Priority: Medium     Lot # [id number] Expiration date 04/20       Excessive or frequent menstruation 05/26/2016     Priority: Medium     Major  depression in complete remission (H) 04/18/2016     Priority: Medium     Anxiety state 04/02/2007     Priority: Medium     Overview:   Epic        Arthropathy 06/05/2006     Priority: Medium     Problem list name updated by automated process. Provider to review       Tobacco use disorder 09/19/2005     Priority: Medium        Past Medical History:    Past Medical History:   Diagnosis Date     Contact dermatitis and other eczema, due to unspecified cause        Past Surgical History:    No past surgical history on file.    Family History:    Family History   Problem Relation Age of Onset     Cancer Mother         pancreatic     C.A.D. Father         CABG, onset heart disease in late 40s or early 50s     Neuropathy Sister         MS     Cancer Paternal Aunt         liver cancer       Social History:  Marital Status:  Single [1]  Social History     Tobacco Use     Smoking status: Current Every Day Smoker     Packs/day: 1.00     Years: 20.00     Pack years: 20.00     Types: Cigarettes     Smokeless tobacco: Never Used   Substance Use Topics     Alcohol use: Yes     Comment: very rarely     Drug use: No        Medications:      hydrOXYzine (ATARAX) 10 MG tablet   cyclobenzaprine (FLEXERIL) 10 MG tablet   ibuprofen (ADVIL/MOTRIN) 200 MG tablet   predniSONE (DELTASONE) 20 MG tablet   sertraline (ZOLOFT) 100 MG tablet         Review of Systems   Constitutional: Negative.         Itching   HENT: Positive for facial swelling (over the eyelids and lips).    Eyes: Negative.    Respiratory: Negative.    Cardiovascular: Negative.    Gastrointestinal: Negative.    Endocrine: Negative.    Genitourinary: Negative.    Musculoskeletal: Negative.    Skin: Negative.    Allergic/Immunologic: Negative.    Neurological: Negative.    Hematological: Negative.    Psychiatric/Behavioral: Negative.    All other systems reviewed and are negative.      Physical Exam   BP: 118/79  Pulse: 113  Temp: 98.1  F (36.7  C)  Resp: 16  Height: 162.6 cm (5'  "4\")  Weight: 77.1 kg (170 lb)(stated)  SpO2: 98 %      Physical Exam   Constitutional: She is oriented to person, place, and time. She appears well-developed and well-nourished. No distress.   HENT:   Head: Normocephalic and atraumatic.       Eyes: Pupils are equal, round, and reactive to light. EOM are normal. Right eye exhibits no discharge. Left eye exhibits no discharge. No scleral icterus.   Neck: Normal range of motion. Neck supple. No JVD present. No tracheal deviation present. No thyromegaly present.   Cardiovascular: Exam reveals no gallop and no friction rub.   No murmur heard.  Pulmonary/Chest: Effort normal and breath sounds normal. No stridor. No respiratory distress. She has no wheezes. She has no rales.   Abdominal: Soft. Bowel sounds are normal. She exhibits no distension and no mass. There is no tenderness. There is no rebound and no guarding. No hernia.   Musculoskeletal: Normal range of motion. She exhibits no edema, tenderness or deformity.   Lymphadenopathy:     She has no cervical adenopathy.   Neurological: She is alert and oriented to person, place, and time. GCS eye subscore is 4. GCS verbal subscore is 5. GCS motor subscore is 6.   Skin: Capillary refill takes less than 2 seconds. Rash noted. She is not diaphoretic. No erythema. No pallor.        Psychiatric: She has a normal mood and affect. Her behavior is normal. Thought content normal.                                   ED Course        Procedures               Critical Care time:  none               ED medications:  Medications   cetirizine (zyrTEC) tablet 10 mg (10 mg Oral Given 7/29/19 1831)       ED labs and imaging: none      ED vitals:  Vitals:    07/29/19 1542   BP: 118/79   Pulse: 113   Resp: 16   Temp: 98.1  F (36.7  C)   TempSrc: Oral   SpO2: 98%   Weight: 77.1 kg (170 lb)   Height: 1.626 m (5' 4\")           Assessments & Plan (with Medical Decision Making)   Clinical impression: 44-year-old female who presented with concern " for increase in symptoms after recently being evaluated for possible allergic reaction with urticaria. Patient was evaluated in the  department 24 hours earlier on July 28 (treated with IV solumedrol and benadryl. Discharged home with oral prednisone)  During her evaluation earlier she reported a history of stress-induced urticaria during summer months (late July through early August) patient has not been given a formal diagnosis with prior episodes in the last 3 to 4 years by report.  .  History of eczema and  shingles. She arrived this evening with her boyfriend (Stephan) reporting concern about increasing symptoms.  She reported doing well after her visit yesterday in the department. she was prescribed 40 mg take daily over the next 5 days.  She left work and came home and took some Benadryl and awoke with puffiness in her eyelids, and lip swelling, with itching. Similar symptoms in the last 3-4 years beginning around the  end of July to the first week in August usually after  a bite and then often generalizes.  She typically has symptoms involving her lower extremities with eventual spread to involve other parts of her body including  Trunk. Patient , reports prior episodes of facial swelling.   On my exam she wass pleasant,in no acute distress.  swelling involving  eyelids, lips, itchy.  Please see photo in the physical exam section above-for distribution of swelling involving the eyelids, lips, and rash lesion involving  face and  body.  Photo was obtained after informed verbal consent from the patient.  No wheezing or crackles, she reported no abdominal pain no nausea.  She was hemodynamically normal.      ED course and Plan:  I reviewed patient's medical records including recent evaluation in the department on July 28.  I had a long discussion with the patient and her boyfriend (Stephan).  We reviewed  chronicity of symptoms. We discussed that it would be she has follow-up in allergy clinic.  Presentation is not  consistent with cholinergic urticaria although she does report that she has a tendency to have the symptoms in the mid summer months especially when she is stressed.  She had taken a dose of prednisone today and did take Benadryl prior to arriving in the emergency department including Zantac.  She was given Zyrtec in the emergency department.  Her exam was not consistent with or concerning for anaphylaxis.  We discussed IM epinephrine with eyelid swelling,and lip swelling, hives with itching.  After discussing risk and benefit of IM epinephrine and medical indication for epinephrine patient declined epinephrine.  After period of observation patient requested to be discharged home.  W reviewed next steps for care and also reviewed reasons to return to the emergency department for care.  I  provided a prescription for hydroxyzine. She was counseled on how to use hydroxyzine to help her with itching.  We discussed likely synergistic effect with hydroxyzine and cetirizine and the risk of drowsiness or somnolence.  She is to complete initial prescription of prednisone provided during initial visit on July 28. Patient and boyfriend (Stephan) expressed comfort and understanding of care.        Disclaimer: This note consists of symbols derived from keyboarding, dictation and/or voice recognition software. As a result, there may be errors in the script that have gone undetected. Please consider this when interpreting information found in this chart.  I have reviewed the nursing notes.    I have reviewed the findings, diagnosis, plan and need for follow up with the patient.          Medication List      Started    hydrOXYzine 10 MG tablet  Commonly known as:  ATARAX  10 mg, Oral, 3 TIMES DAILY PRN            Final diagnoses:   Itching - Generalized itching with hives, in the last 24 hours. Acutely, prior history of similar symptoms over the last 3 to 4 years   Facial swelling - Predominantly over the lips and eyelids, with  itching and hives.  History of similar and recurrent symptoms over the last 3 to 4 years   This document serves as a record of the services and decisions personally performed and made by Jaden Baez. The HPI was created on his behalf by Rickey Winchester, a trained medical scribe. The creation of this document is based on the provider's statements to the medical scribe  Rickey Winchester  5:58 PM July 29, 2019    Provider:  The information in this document created by the medical scribe for me, accurately reflects the services I personally performed and the decisions made by me. I have reviewed and approved this document for accuracy prior to leaving the patient care area.  Jaden Baez, 5:58 PM July 29, 2019 7/29/2019   [facility] EMERGENCY DEPARTMENT     Jaden Baez MD  07/30/19 0127

## 2019-07-29 NOTE — ED AVS SNAPSHOT
[facility] Emergency Department  [address] [address]  Phone:  [phone]  Fax:  [phone]                                    Sofia Garcia   MRN: [id number]    Department:  [facility] Emergency Department   Date of Visit:  7/29/2019           After Visit Summary Signature Page    I have received my discharge instructions, and my questions have been answered. I have discussed any challenges I see with this plan with the nurse or doctor.    ..........................................................................................................................................  Patient/Patient Representative Signature      ..........................................................................................................................................  Patient Representative Print Name and Relationship to Patient    ..................................................               ................................................  Date                                   Time    ..........................................................................................................................................  Reviewed by Signature/Title    ...................................................              ..............................................  Date                                               Time          22EPIC Rev 08/18

## 2019-07-30 LAB — TRYPTASE SERPL-MCNC: 7.3 UG/L

## 2019-07-30 NOTE — DISCHARGE INSTRUCTIONS
1) The cause of your symptoms as reported is not clear.  You have reported this as similar to prior episodes in the last 3-4 is.  We discussed a variety of possibilities.  We have also discussed her plan for treatment including taking Zyrtec 10 mg daily over the next 1 week, continue prednisone provided after your visit yesterday July 28, 2019.    2) A referral was placed to the allergy clinic-to help with outpatient follow-up, he should be called for follow-up appointment within the next few days.  Please call the clinic tomorrow July 30, 2019 to confirm and schedule an appointment for follow-up.    3) if your symptoms worsen, including but not limited to difficulty swallowing, trouble breathing, increasing facial swelling or swelling involving your tongue, vomiting or shortness of breath you should return to the emergency department for evaluation of call 911    4) you are provided a prescription of hydroxyzine which you may use to help in addition to Zyrtec to help with itching we have discussed the likelihood that you may develop withdrawal drug response to this and this can also cause some drowsiness, versus taking Benadryl alone.  Please see the handout provided for additional details.

## 2019-07-30 NOTE — ED NOTES
Pt states can't tell a big difference in swelling after zyrtec. States would like to go home. MD updated.

## 2019-07-31 ENCOUNTER — MYC MEDICAL ADVICE (OUTPATIENT)
Dept: FAMILY MEDICINE | Facility: CLINIC | Age: 44
End: 2019-07-31

## 2019-07-31 NOTE — TELEPHONE ENCOUNTER
"   S: (see allnet note)  Has been to ER twice this past week for hives.     B: I called her,She reports she still  has prednisone until Friday, when she sees allergy    \"Monday I had to go to work and when I got home my eyes and lips were swollen, so I went back to the ER, and since then it hasn't gotten worse, it just has not gotten better. \"  \"right now I feel ok, but like last night was up all night with them  on my legs \"  Denies eyes or face swollen right now.     Patient does not feel she is worse in any way, \"other than I have anxiety\" \"I just didn't know if I should be concerned because it isn't gone away or a whole lot better since I was on the prednisone and hydroxyzine. \"    Reviewed trying cool baths, or cool baking soda or oatmeal baths, rubbing itchy areas with a wet ice cube,  drinking a lot of fluids jairon water, and doing her own  work to see if she can determine anything that may be affecting her. \"we wonder if it is the [place] people that spray  Something in our development at this time of year. \"    Suggested she write down everything she is eating and drinking and write down when sx are worse, when she takes meds and bring this to her allergy appt on Friday.     A: still has hives, \"they are coming and going\"   Worse at night, says she wasn't taking the benadryl anymore, just zyrtec and that is once per day.       R: Advised if fever, swelling of mouth/ tongue or throat , difficulty swallowing or breathing, abd pain, nausea or vomiting, any new or worsening sx to call 911 or present to the nearest ED immed. , otherwise to continue home cares  and prescribed meds and be seen in allergy clinic Friday if no worse in the meantime. To present to ER or call 911 at any time that she has worsening or concerns.     Patient appreciated the call and suggestions, will call back at any time if she needs further assistance.   Sent info from references to her also on hives.     Eli Drew RNC     "

## 2019-08-02 ENCOUNTER — OFFICE VISIT (OUTPATIENT)
Dept: ALLERGY | Facility: CLINIC | Age: 44
End: 2019-08-02
Payer: COMMERCIAL

## 2019-08-02 VITALS
SYSTOLIC BLOOD PRESSURE: 120 MMHG | WEIGHT: 177.69 LBS | TEMPERATURE: 98.4 F | BODY MASS INDEX: 29.61 KG/M2 | DIASTOLIC BLOOD PRESSURE: 81 MMHG | OXYGEN SATURATION: 96 % | HEIGHT: 65 IN | HEART RATE: 95 BPM

## 2019-08-02 DIAGNOSIS — L50.9 URTICARIA: Primary | ICD-10-CM

## 2019-08-02 PROCEDURE — 99204 OFFICE O/P NEW MOD 45 MIN: CPT | Performed by: ALLERGY & IMMUNOLOGY

## 2019-08-02 RX ORDER — HYDROXYZINE HYDROCHLORIDE 10 MG/1
10 TABLET, FILM COATED ORAL 3 TIMES DAILY PRN
Qty: 7 TABLET | Refills: 0 | Status: SHIPPED | OUTPATIENT
Start: 2019-08-02 | End: 2020-06-25

## 2019-08-02 RX ORDER — CETIRIZINE HYDROCHLORIDE 10 MG/1
10 TABLET ORAL DAILY
COMMUNITY

## 2019-08-02 RX ORDER — EPINEPHRINE 0.3 MG/.3ML
0.3 INJECTION SUBCUTANEOUS PRN
Qty: 1 EACH | Refills: 3 | Status: SHIPPED | OUTPATIENT
Start: 2019-08-02 | End: 2023-04-26

## 2019-08-02 ASSESSMENT — ENCOUNTER SYMPTOMS
COUGH: 0
VOMITING: 0
FACIAL SWELLING: 1
ACTIVITY CHANGE: 0
EYE REDNESS: 0
WHEEZING: 0
FEVER: 0
CHEST TIGHTNESS: 0
RHINORRHEA: 1
SINUS PRESSURE: 0
SHORTNESS OF BREATH: 0
ADENOPATHY: 0
CHILLS: 0
MYALGIAS: 0
EYE DISCHARGE: 0
NAUSEA: 0
EYE ITCHING: 0
DIARRHEA: 0
JOINT SWELLING: 0
HEADACHES: 1

## 2019-08-02 ASSESSMENT — MIFFLIN-ST. JEOR: SCORE: 1469.37

## 2019-08-02 NOTE — LETTER
8/2/2019         RE: Sofia Garcia  [id number] [facility] [address]        Dear Colleague,    Thank you for referring your patient, Sofia Garcia, to the [facility]. Please see a copy of my visit note below.    SUBJECTIVE:                                                               Sofia Garcia presents today to our Allergy Clinic at [facility] for a new patient visit.  She is a 44-year-old female with a history of anxiety, rheumatoid arthritis, depression, tobacco use disorder, and eczema.  The patient is accompanied by significant other who helps to provide history.   She does have a history of hives lasting for 1-2 weeks in her 20s. She also had hives without reason here and there, but mild.  For the last 3-4 years, she has had a rash at the end of July-beginning of August. She was seen PCP, ED, and Dermatology in the past and diagnosed with dermatitis at that time (8/2015).    On July 27, a approximately 6 pm, she had a lump on her right elbow that looked like a mosquito bite that spread on her body extremities. She did not eat anything for 2 hours prior. About 4 hours before she ate a hamburger. She did have cramps last week and took ibuprofen, but she does not specifically remember if she took it on that day.   She woke up at 4 am, because of the generalized pruritus. She denies having any rhinoconjunctivitis symptoms at that time. No urticaria, angioedema, vomiting, nausea, diarrhea, wheezing, or rhinoconjunctivitis symptoms at that time. She took diphenhydramine without major improvement.  She was seen in the ED on July 28. Urticarial rash noted on the patient's extremities, neck, back, and abdomen.  VS were within normal limits.  She was treated with Solu-Medrol 125 mg IV along with diphenhydramine 25 mg IV. Because she got better, she was discharged home with prednisone 40 mg by mouth once daily for 5 days and was recommended to take diphenhydramine  by mouth as needed and Zantac. Even though the rash improved, it did not resolve.   That evening she took some cookies and chips. She did not have any red meat.   Next day, at approximately 5 am, she woke up with some puffiness/red marks around her eyes bilaterally.  Hives would not last more than 24-48 hours. The smallest hive was a size of a pencil eraser and the largest several inches. She had bruising because she was itching her skin.   She took her prednisone but didn't take diphenhydramine. Then she went to work. At approximately, 9:30 am, she went home. At that point her eyes started swelling, and she developed hives on her neck. She took diphenhydramine, went to sleep, and then woke up with facial swelling, eyelid and lip swelling. At that point, they presented to the ED. She was recommended to take cetirizine, ranitidine, and hydroxyzine, and continue prednisone. Serum tryptase level was done at that time, which was within normal limits.   Component      Latest Ref Rng & Units 7/29/2019   Tryptase ug/L      <11.0 ug/L 7.3         Next day, on Tuesday, July 29, she felt better in the morning, but then got worse at night. Ate burgers and cannot exclude taking ibuprofen on that day.   On Wednesday, July 30, she started getting better and became hive free yesterday. She finished her last dose of prednisone this morning.   She takes cetirizine 10 mg by mouth once daily and ranitidine 75 mg by mouth twice daily. She tends to develop mild nasal congestion in Spring. She does not think she has issues in Fall. She does not garden.  Patient Active Problem List   Diagnosis     Tobacco use disorder     Arthropathy     Major depression in complete remission (H)     Excessive or frequent menstruation     Encounter for insertion of mirena IUD     RA (rheumatoid arthritis) (H)     Hypovitaminosis D     Anxiety state       Past Medical History:   Diagnosis Date     Contact dermatitis and other eczema, due to unspecified  cause     hands     RA (rheumatoid arthritis) (H)     diagnosed 12-15 years ago      Problem (# of Occurrences) Relation (Name,Age of Onset)    C.A.D. (1) Father: CABG, onset heart disease in late 40s or early 50s    Cancer (2) Mother: pancreatic, Paternal Aunt: liver cancer    Neuropathy (1) Sister (wilder): MS        History reviewed. No pertinent surgical history.  Social History     Socioeconomic History     Marital status: Single     Spouse name: None     Number of children: None     Years of education: None     Highest education level: None   Occupational History     None   Social Needs     Financial resource strain: None     Food insecurity:     Worry: None     Inability: None     Transportation needs:     Medical: None     Non-medical: None   Tobacco Use     Smoking status: Current Every Day Smoker     Packs/day: 1.00     Years: 20.00     Pack years: 20.00     Types: Cigarettes     Smokeless tobacco: Never Used   Substance and Sexual Activity     Alcohol use: Yes     Comment: very rarely     Drug use: No     Sexual activity: Yes     Partners: Male     Birth control/protection: Male Surgical     Comment: stopped taking birth control September 2015   Lifestyle     Physical activity:     Days per week: None     Minutes per session: None     Stress: None   Relationships     Social connections:     Talks on phone: None     Gets together: None     Attends Anabaptist service: None     Active member of club or organization: None     Attends meetings of clubs or organizations: None     Relationship status: None     Intimate partner violence:     Fear of current or ex partner: None     Emotionally abused: None     Physically abused: None     Forced sexual activity: None   Other Topics Concern     Parent/sibling w/ CABG, MI or angioplasty before 65F 55M? Yes     Comment: father   Social History Narrative    August 2, 2019        ENVIRONMENTAL HISTORY: [place]. The home is  heated with a forced air and gas furnace. They do have central air conditioning. The patient's bedroom is furnished with carpeting in bedroom and fabric window coverings.  Pets inside the house include 1 cat(s) and 1 dog(s). There is no history of cockroach or mice infestation. There is/are 1 smoker, smokes outside/garage.  The house does not have a damp basement.            Review of Systems   Constitutional: Negative for activity change, chills and fever.   HENT: Positive for ear pain, facial swelling, rhinorrhea (outside) and sneezing. Negative for congestion, dental problem, nosebleeds, postnasal drip and sinus pressure.    Eyes: Negative for discharge, redness and itching.   Respiratory: Negative for cough, chest tightness, shortness of breath and wheezing.    Cardiovascular: Negative for chest pain.   Gastrointestinal: Negative for diarrhea, nausea and vomiting.   Musculoskeletal: Negative for joint swelling and myalgias.   Skin: Negative for rash.   Neurological: Positive for headaches.   Hematological: Negative for adenopathy.   Psychiatric/Behavioral: Negative for behavioral problems and self-injury.           Current Outpatient Medications:      cetirizine (ZYRTEC) 10 MG tablet, Take 10 mg by mouth daily, Disp: , Rfl:      cyclobenzaprine (FLEXERIL) 10 MG tablet, Take 1 tablet (10 mg) by mouth 3 times daily as needed for muscle spasms, Disp: 30 tablet, Rfl: 3     EPINEPHrine (EPIPEN/ADRENACLICK/OR ANY BX GENERIC EQUIV) 0.3 MG/0.3ML injection 2-pack, Inject 0.3 mLs (0.3 mg) into the muscle as needed for anaphylaxis, Disp: 1 each, Rfl: 3     hydrOXYzine (ATARAX) 10 MG tablet, Take 1 tablet (10 mg) by mouth 3 times daily as needed for itching, Disp: 7 tablet, Rfl: 0     ibuprofen (ADVIL/MOTRIN) 200 MG tablet, Take 400 mg by mouth every 4 hours as needed for mild pain, Disp: , Rfl:      predniSONE (DELTASONE) 20 MG tablet, Take two tablets (= 40mg) each day for 5 (five) days, Disp: 10 tablet, Rfl: 0      "ranitidine (ZANTAC) 150 MG tablet, Take 1 tablet (150 mg) by mouth 2 times daily, Disp: 60 tablet, Rfl: 3     sertraline (ZOLOFT) 100 MG tablet, Take 2 tablets (200 mg) by mouth daily (Patient taking differently: Take 200 mg by mouth At Bedtime ), Disp: 180 tablet, Rfl: 3  Immunization History   Administered Date(s) Administered     HEPA 04/06/2000, 10/27/2000     HepB 04/06/2000, 05/17/2000, 10/27/2000     HepB-Adult 12/28/2011     Influenza (IIV3) PF 11/07/2006     TD (ADULT, 7+) 01/01/1992, 12/10/2002     Tdap (Adacel,Boostrix) 07/21/2010     No Known Allergies  OBJECTIVE:                                                                 /81 (BP Location: Left arm, Patient Position: Sitting, Cuff Size: Adult Regular)   Pulse 95   Temp 98.4  F (36.9  C) (Tympanic)   Ht 1.655 m (5' 5.16\")   Wt 80.6 kg (177 lb 11.1 oz)   SpO2 96%   BMI 29.43 kg/m           Physical Exam   Constitutional: She is oriented to person, place, and time. No distress.   HENT:   Head: Normocephalic and atraumatic.   Right Ear: Tympanic membrane, external ear and ear canal normal.   Left Ear: Tympanic membrane, external ear and ear canal normal.   Nose: No mucosal edema or rhinorrhea.   Mouth/Throat: Oropharynx is clear and moist and mucous membranes are normal. No oropharyngeal exudate.   Eyes: Conjunctivae are normal. Right eye exhibits no discharge. Left eye exhibits no discharge.   Neck: Normal range of motion.   Cardiovascular: Normal rate, regular rhythm and normal heart sounds.   No murmur heard.  Pulmonary/Chest: Effort normal and breath sounds normal. No respiratory distress. She has no wheezes. She has no rales.   Musculoskeletal: Normal range of motion.   Lymphadenopathy:     She has no cervical adenopathy.   Neurological: She is alert and oriented to person, place, and time.   Skin: Skin is warm. No rash noted. She is not diaphoretic.   Psychiatric: She has a normal mood and affect. Her behavior is normal.   Nursing note " and vitals reviewed.        ASSESSMENT/PLAN:         Visit Diagnoses     1. Urticaria    -  Primary  Associated with lip and eyelid angioedema.  Idiopathic versus NSAID induced versus a food allergy.  The first episode happened while she did not eat anything for 2 hours before symptoms.  Approximately 4 hours before, she had a hamburger.  -Ordered alpha-gal serum IgE along with total serum IgE.  For now, strict red meat avoidance.  -Start strict NSAIDs avoidance.  If she has no further episodes, consider challenge to NSAIDs.  If she has further episodes soon, then with NSAIDs avoidance, reaction to that group of medicine would be ruled out.  Start cetirizine 10 mg by mouth twice daily.  Start ranitidine 150 mg by mouth twice daily.  Take hydroxyzine 10 mg by mouth 3 times daily as needed.  She does well, in 2 weeks, decreased both cetirizine and ranitidine to once a day, and in 2 more weeks, she may stop it.    Relevant Medications    cetirizine (ZYRTEC) 10 MG tablet    ranitidine (ZANTAC) 150 MG tablet    hydrOXYzine (ATARAX) 10 MG tablet    EPINEPHrine (EPIPEN/ADRENACLICK/OR ANY BX GENERIC EQUIV) 0.3 MG/0.3ML injection 2-pack    Other Relevant Orders    IgE    Galactose alpha 1 3 Galactose IgE            Return in about 6 weeks (around 9/13/2019), or if symptoms worsen or fail to improve.    Thank you for allowing us to participate in the care of this patient. Please feel free to contact us if there are any questions or concerns about the patient.    Disclaimer: This note consists of symbols derived from keyboarding, dictation and/or voice recognition software. As a result, there may be errors in the script that have gone undetected. Please consider this when interpreting information found in this chart.    Uche Lee MD, FAAAAI, FACAAI  Allergy, Asthma and Immunology  [place], [place] and [place]      Again, thank you for allowing me to participate in the care of your patient.         Sincerely,        Uche Lee MD

## 2019-08-02 NOTE — PATIENT INSTRUCTIONS
Get the bloodwork done in 4 weeks  Avoid red meat and NSAIDs.     Take cetirizine 10 mg by mouth twice daily for 2 weeks, and then go back to once a day, and then 2 weeks later, stop. Do the same with Zantac.     APIRINS/NSAID-SENSITIVE INDIVIDUALS: PRODUCTS TO AVOID  ASPIRIN (ACETYLSALICILIC ACID)  Yudelka-Oviedo/Plus Duoprin Mobigesic Tablets  Arthritis Pain Formula Duradyne Ct-Cyk-Ngslp Tablets  Anacin Dynosal Pabalate   Ascriptin Ecotrin Palbrin Buffered Tablets  Aspergum Efficin Pepto-Bismol  Meghan Emagin Percodan  Bufferin Empirin Propoxyphene  Cama Excedrin (aspirin formula) 65 (Darvon Compound)  Congesprin 4-Way Cold Tablets Robasisal Tablets  [place] Fiorinal Sine-Off Sinus Medicine  Coridicin Gaysal-S Synalogos  Cosprin Gemnisin Talwin  Dasin Measurin Triaminicin  Jonatan s Pills Midol Caplets Vanquish    METHYL SALICYLATE (WINTERGREEN)  (Topical anti-pain or anti-swelling products)  Carlos Rivera Vicks Sinex Listerine  Heet Decongestant Spray Mentholatum  Icy Hot Heating Lotion Soltice     NONSTEROIDAL ANTI-INFLAMMATORY AGENTS  (Cross-react in aspirin-sensitive individuals)  Alclofenac (Mervan) Ketorolac (Toradol)  Azapropaxone (Rheumox) Meclofenamate (Meclomen)  Diclofenac (Voltarol) Mefenamic Acid (Ponstel)  Diflunisal (Colobid) Nabumetone (Relafen)  Etodolac (Lodine) Naproxen (Naprosyn, Anaprox)  Fenoprofen (Nalfon) Oxyprozin (Daypro)  Feprazone (Methrazone) Pehnylbutazone (Veutazolidin, Azolid)  Flurbiprofen (Ansaid) Oxalic (Tandearil)  Ibuprofen (Motrin, Nuprin, Advil, Medipren) Piroxicam (Feldene)  Trandar (Rufen) Sulindac (Clinoril)  Indomethacin (Indocin) Tometin (Tolecctin)  Ketoprofen (Orudis) Zomepirac (Zomax)  This list is a guide, but is not all inclusive of every medication or ingredient. Check with your doctor and pharmacist regarding specific contents for medications. Read ingredient labels carefully.

## 2019-08-02 NOTE — NURSING NOTE
RN reviewed Anaphylaxis Action Plan with patient. Educated on the symptoms and treatment of anaphylaxis. Went through the different ways that a reaction can present, and the body systems that it can affect. Patient verbalized understanding.    Write demonstrated epi pen technique.  Informed that she must pull blue end off of pen before use.  Hold firmly in one hand and press down into the thigh. The injection should go in the middle, outer thigh and hold for 10 seconds to ensure the delivery of all medication.  Informed that the needle can go through clothing but that any seams should be avoided.  Instructed to keep both pens together in case a second dose is needed.  Instructed that if epi is used, he should still go to the ED.  Instructed to keep epi-pen out of extreme temperatures.  Check expiration date.  Do not use  Epi.  Patient verbalized understanding.    Writer demonstrated how to use the AdrenClick epinephrine auto-injector.  Patient was instructed to remove auto-injector from casing.  Patient instructed to form a fist around the auto-injector, remove caps labeled 1 and then 2 (never placing finger/thumb over ), then firmly push red tip against outer thigh, holding approximately 10 seconds.  Patient advised that once used, needle WILL be exposed.  Patient is to properly dispose of needle in sharps container and not regular trash can.  Patient advised to call 911 or go to emergency department after epi-pen use for further monitoring.         Yu Moreno RN

## 2019-08-02 NOTE — PROGRESS NOTES
SUBJECTIVE:                                                               Sofia Garcia presents today to our Allergy Clinic at [facility] for a new patient visit.  She is a 44-year-old female with a history of anxiety, rheumatoid arthritis, depression, tobacco use disorder, and eczema.  The patient is accompanied by significant other who helps to provide history.   She does have a history of hives lasting for 1-2 weeks in her 20s. She also had hives without reason here and there, but mild.  For the last 3-4 years, she has had a rash at the end of July-beginning of August. She was seen PCP, ED, and Dermatology in the past and diagnosed with dermatitis at that time (8/2015).    On July 27, a approximately 6 pm, she had a lump on her right elbow that looked like a mosquito bite that spread on her body extremities. She did not eat anything for 2 hours prior. About 4 hours before she ate a hamburger. She did have cramps last week and took ibuprofen, but she does not specifically remember if she took it on that day.   She woke up at 4 am, because of the generalized pruritus. She denies having any rhinoconjunctivitis symptoms at that time. No urticaria, angioedema, vomiting, nausea, diarrhea, wheezing, or rhinoconjunctivitis symptoms at that time. She took diphenhydramine without major improvement.  She was seen in the ED on July 28. Urticarial rash noted on the patient's extremities, neck, back, and abdomen.  VS were within normal limits.  She was treated with Solu-Medrol 125 mg IV along with diphenhydramine 25 mg IV. Because she got better, she was discharged home with prednisone 40 mg by mouth once daily for 5 days and was recommended to take diphenhydramine by mouth as needed and Zantac. Even though the rash improved, it did not resolve.   That evening she took some cookies and chips. She did not have any red meat.   Next day, at approximately 5 am, she woke up with some puffiness/red marks  around her eyes bilaterally.  Hives would not last more than 24-48 hours. The smallest hive was a size of a pencil eraser and the largest several inches. She had bruising because she was itching her skin.   She took her prednisone but didn't take diphenhydramine. Then she went to work. At approximately, 9:30 am, she went home. At that point her eyes started swelling, and she developed hives on her neck. She took diphenhydramine, went to sleep, and then woke up with facial swelling, eyelid and lip swelling. At that point, they presented to the ED. She was recommended to take cetirizine, ranitidine, and hydroxyzine, and continue prednisone. Serum tryptase level was done at that time, which was within normal limits.   Component      Latest Ref Rng & Units 7/29/2019   Tryptase ug/L      <11.0 ug/L 7.3         Next day, on Tuesday, July 29, she felt better in the morning, but then got worse at night. Ate burgers and cannot exclude taking ibuprofen on that day.   On Wednesday, July 30, she started getting better and became hive free yesterday. She finished her last dose of prednisone this morning.   She takes cetirizine 10 mg by mouth once daily and ranitidine 75 mg by mouth twice daily. She tends to develop mild nasal congestion in Spring. She does not think she has issues in Fall. She does not garden.  Patient Active Problem List   Diagnosis     Tobacco use disorder     Arthropathy     Major depression in complete remission (H)     Excessive or frequent menstruation     Encounter for insertion of mirena IUD     RA (rheumatoid arthritis) (H)     Hypovitaminosis D     Anxiety state       Past Medical History:   Diagnosis Date     Contact dermatitis and other eczema, due to unspecified cause     hands     RA (rheumatoid arthritis) (H)     diagnosed 12-15 years ago      Problem (# of Occurrences) Relation (Name,Age of Onset)    C.A.D. (1) Father: CABG, onset heart disease in late 40s or early 50s    Cancer (2) Mother:  pancreatic, Paternal Aunt: liver cancer    Neuropathy (1) Sister (wilder): MS        History reviewed. No pertinent surgical history.  Social History     Socioeconomic History     Marital status: Single     Spouse name: None     Number of children: None     Years of education: None     Highest education level: None   Occupational History     None   Social Needs     Financial resource strain: None     Food insecurity:     Worry: None     Inability: None     Transportation needs:     Medical: None     Non-medical: None   Tobacco Use     Smoking status: Current Every Day Smoker     Packs/day: 1.00     Years: 20.00     Pack years: 20.00     Types: Cigarettes     Smokeless tobacco: Never Used   Substance and Sexual Activity     Alcohol use: Yes     Comment: very rarely     Drug use: No     Sexual activity: Yes     Partners: Male     Birth control/protection: Male Surgical     Comment: stopped taking birth control September 2015   Lifestyle     Physical activity:     Days per week: None     Minutes per session: None     Stress: None   Relationships     Social connections:     Talks on phone: None     Gets together: None     Attends Hinduism service: None     Active member of club or organization: None     Attends meetings of clubs or organizations: None     Relationship status: None     Intimate partner violence:     Fear of current or ex partner: None     Emotionally abused: None     Physically abused: None     Forced sexual activity: None   Other Topics Concern     Parent/sibling w/ CABG, MI or angioplasty before 65F 55M? Yes     Comment: father   Social History Narrative    August 2, 2019        ENVIRONMENTAL HISTORY: [place] The home is heated with a forced air and gas furnace. They do have central air conditioning. The patient's bedroom is furnished with carpeting in bedroom and fabric window coverings.  Pets inside the house include 1 cat(s) and 1 dog(s). There is  no history of cockroach or mice infestation. There is/are 1 smoker, smokes outside/garage.  The house does not have a damp basement.            Review of Systems   Constitutional: Negative for activity change, chills and fever.   HENT: Positive for ear pain, facial swelling, rhinorrhea (outside) and sneezing. Negative for congestion, dental problem, nosebleeds, postnasal drip and sinus pressure.    Eyes: Negative for discharge, redness and itching.   Respiratory: Negative for cough, chest tightness, shortness of breath and wheezing.    Cardiovascular: Negative for chest pain.   Gastrointestinal: Negative for diarrhea, nausea and vomiting.   Musculoskeletal: Negative for joint swelling and myalgias.   Skin: Negative for rash.   Neurological: Positive for headaches.   Hematological: Negative for adenopathy.   Psychiatric/Behavioral: Negative for behavioral problems and self-injury.           Current Outpatient Medications:      cetirizine (ZYRTEC) 10 MG tablet, Take 10 mg by mouth daily, Disp: , Rfl:      cyclobenzaprine (FLEXERIL) 10 MG tablet, Take 1 tablet (10 mg) by mouth 3 times daily as needed for muscle spasms, Disp: 30 tablet, Rfl: 3     EPINEPHrine (EPIPEN/ADRENACLICK/OR ANY BX GENERIC EQUIV) 0.3 MG/0.3ML injection 2-pack, Inject 0.3 mLs (0.3 mg) into the muscle as needed for anaphylaxis, Disp: 1 each, Rfl: 3     hydrOXYzine (ATARAX) 10 MG tablet, Take 1 tablet (10 mg) by mouth 3 times daily as needed for itching, Disp: 7 tablet, Rfl: 0     ibuprofen (ADVIL/MOTRIN) 200 MG tablet, Take 400 mg by mouth every 4 hours as needed for mild pain, Disp: , Rfl:      predniSONE (DELTASONE) 20 MG tablet, Take two tablets (= 40mg) each day for 5 (five) days, Disp: 10 tablet, Rfl: 0     ranitidine (ZANTAC) 150 MG tablet, Take 1 tablet (150 mg) by mouth 2 times daily, Disp: 60 tablet, Rfl: 3     sertraline (ZOLOFT) 100 MG tablet, Take 2 tablets (200 mg) by mouth daily (Patient taking differently: Take 200 mg by mouth At  "Bedtime ), Disp: 180 tablet, Rfl: 3  Immunization History   Administered Date(s) Administered     HEPA 04/06/2000, 10/27/2000     HepB 04/06/2000, 05/17/2000, 10/27/2000     HepB-Adult 12/28/2011     Influenza (IIV3) PF 11/07/2006     TD (ADULT, 7+) 01/01/1992, 12/10/2002     Tdap (Adacel,Boostrix) 07/21/2010     No Known Allergies  OBJECTIVE:                                                                 /81 (BP Location: Left arm, Patient Position: Sitting, Cuff Size: Adult Regular)   Pulse 95   Temp 98.4  F (36.9  C) (Tympanic)   Ht 1.655 m (5' 5.16\")   Wt 80.6 kg (177 lb 11.1 oz)   SpO2 96%   BMI 29.43 kg/m          Physical Exam   Constitutional: She is oriented to person, place, and time. No distress.   HENT:   Head: Normocephalic and atraumatic.   Right Ear: Tympanic membrane, external ear and ear canal normal.   Left Ear: Tympanic membrane, external ear and ear canal normal.   Nose: No mucosal edema or rhinorrhea.   Mouth/Throat: Oropharynx is clear and moist and mucous membranes are normal. No oropharyngeal exudate.   Eyes: Conjunctivae are normal. Right eye exhibits no discharge. Left eye exhibits no discharge.   Neck: Normal range of motion.   Cardiovascular: Normal rate, regular rhythm and normal heart sounds.   No murmur heard.  Pulmonary/Chest: Effort normal and breath sounds normal. No respiratory distress. She has no wheezes. She has no rales.   Musculoskeletal: Normal range of motion.   Lymphadenopathy:     She has no cervical adenopathy.   Neurological: She is alert and oriented to person, place, and time.   Skin: Skin is warm. No rash noted. She is not diaphoretic.   Psychiatric: She has a normal mood and affect. Her behavior is normal.   Nursing note and vitals reviewed.        ASSESSMENT/PLAN:         Visit Diagnoses     1. Urticaria    -  Primary  Associated with lip and eyelid angioedema.  Idiopathic versus NSAID induced versus a food allergy.  The first episode happened while she " did not eat anything for 2 hours before symptoms.  Approximately 4 hours before, she had a hamburger.  -Ordered alpha-gal serum IgE along with total serum IgE.  For now, strict red meat avoidance.  -Start strict NSAIDs avoidance.  If she has no further episodes, consider challenge to NSAIDs.  If she has further episodes soon, then with NSAIDs avoidance, reaction to that group of medicine would be ruled out.  Start cetirizine 10 mg by mouth twice daily.  Start ranitidine 150 mg by mouth twice daily.  Take hydroxyzine 10 mg by mouth 3 times daily as needed.  She does well, in 2 weeks, decreased both cetirizine and ranitidine to once a day, and in 2 more weeks, she may stop it.    Relevant Medications    cetirizine (ZYRTEC) 10 MG tablet    ranitidine (ZANTAC) 150 MG tablet    hydrOXYzine (ATARAX) 10 MG tablet    EPINEPHrine (EPIPEN/ADRENACLICK/OR ANY BX GENERIC EQUIV) 0.3 MG/0.3ML injection 2-pack    Other Relevant Orders    IgE    Galactose alpha 1 3 Galactose IgE            Return in about 6 weeks (around 9/13/2019), or if symptoms worsen or fail to improve.    Thank you for allowing us to participate in the care of this patient. Please feel free to contact us if there are any questions or concerns about the patient.    Disclaimer: This note consists of symbols derived from keyboarding, dictation and/or voice recognition software. As a result, there may be errors in the script that have gone undetected. Please consider this when interpreting information found in this chart.    Uche Lee MD, FAAAAI, FACAAI  Allergy, Asthma and Immunology  [place], [place] and [place]

## 2019-08-02 NOTE — LETTER
ANAPHYLAXIS ALLERGY PLAN    Name: Sofia Garcia      :  1975    Allergy to:  Currently idiopathic    Weight: 177 lbs 11.05 oz           Asthma:  No      Do not depend on antihistamines or inhalers (bronchodilators) to treat a severe reaction; USE EPINEPHRINE      MEDICATIONS/DOSES  Epinephrine:  EpiPen  Epinephrine dose:  0.3 mg IM  Antihistamine:  Zyrtec (Cetirizine)  Antihistamine dose:  20 mg  Other (e.g., inhaler-bronchodilator if wheezing):  none       ANAPHYLAXIS ALLERGY PLAN (Page 2)  Patient:  Sofia Garcia  :  1975         Electronically signed on 2019 by:  Uche Lee MD  Parent/Guardian Authorization Signature:  ___________________________ Date:    FORM PROVIDED COURTESY OF FOOD ALLERGY RESEARCH & EDUCATION (FARE) ([email or web address]) 2017

## 2019-09-03 DIAGNOSIS — L50.9 URTICARIA: ICD-10-CM

## 2019-09-03 PROCEDURE — 36415 COLL VENOUS BLD VENIPUNCTURE: CPT | Performed by: ALLERGY & IMMUNOLOGY

## 2019-09-03 PROCEDURE — 99000 SPECIMEN HANDLING OFFICE-LAB: CPT | Performed by: ALLERGY & IMMUNOLOGY

## 2019-09-03 PROCEDURE — 82785 ASSAY OF IGE: CPT | Performed by: ALLERGY & IMMUNOLOGY

## 2019-09-03 PROCEDURE — 86003 ALLG SPEC IGE CRUDE XTRC EA: CPT | Mod: 90 | Performed by: ALLERGY & IMMUNOLOGY

## 2019-09-04 LAB — IGE SERPL-ACNC: 73 KIU/L (ref 0–114)

## 2019-09-06 LAB — ALPHA-GAL IGE QN: <0.1 KU/L

## 2019-09-09 NOTE — RESULT ENCOUNTER NOTE
[employer] message sent:  Total serum IgE within normal limits.  Negative serum IgE for alpha gal.  -No need an red meat avoidance.

## 2019-09-13 ENCOUNTER — OFFICE VISIT (OUTPATIENT)
Dept: ALLERGY | Facility: CLINIC | Age: 44
End: 2019-09-13
Payer: COMMERCIAL

## 2019-09-13 VITALS
HEART RATE: 90 BPM | DIASTOLIC BLOOD PRESSURE: 71 MMHG | TEMPERATURE: 98 F | WEIGHT: 184.53 LBS | SYSTOLIC BLOOD PRESSURE: 106 MMHG | BODY MASS INDEX: 30.56 KG/M2

## 2019-09-13 DIAGNOSIS — L50.9 URTICARIA: Primary | ICD-10-CM

## 2019-09-13 PROCEDURE — 99213 OFFICE O/P EST LOW 20 MIN: CPT | Performed by: ALLERGY & IMMUNOLOGY

## 2019-09-13 ASSESSMENT — ENCOUNTER SYMPTOMS
SHORTNESS OF BREATH: 0
EYE DISCHARGE: 0
NAUSEA: 0
EYE REDNESS: 0
FACIAL SWELLING: 0
ACTIVITY CHANGE: 0
CHEST TIGHTNESS: 0
COUGH: 0
SINUS PRESSURE: 0
HEADACHES: 0
VOMITING: 0
RHINORRHEA: 0
ARTHRALGIAS: 0
DIARRHEA: 0
CHILLS: 0
MYALGIAS: 0
FEVER: 0
EYE ITCHING: 0
ADENOPATHY: 0
WHEEZING: 0
JOINT SWELLING: 0

## 2019-09-13 NOTE — LETTER
9/13/2019         RE: Sofia Garcia  [id number] [facility] [address]        Dear Colleague,    Thank you for referring your patient, Sofia Garcia, to the [facility]. Please see a copy of my visit note below.    SUBJECTIVE:                                                                   Sofia Garcia presents today to our Allergy Clinic at [facility]; she is being seen for a follow up visit.  As you know, she is a 44 year old female with recurrent urticaria episodes.   She reintroduced red meat without issues.     Component      Latest Ref Rng & Units 7/29/2019 9/3/2019   Tryptase ug/L      <11.0 ug/L 7.3    Galactose alpha 1 3 Galactose IgE      <0.10 kU/L  <0.10       She stopped oral antihistamines 2 weeks ago. She had several episodes when her right upper eyelid appeared swollen in the morning, and as the day would get by, it would resolve.   She didn't have any episodes for the last 5 days. She had several episodes when her lips felt tighter, but didn't appear swollen. She denies episodes of skin pruritus.     She hasn't tried any NSAIDs. She would take acetaminophen as needed.    Patient Active Problem List   Diagnosis     Tobacco use disorder     Arthropathy     Major depression in complete remission (H)     Excessive or frequent menstruation     Encounter for insertion of mirena IUD     RA (rheumatoid arthritis) (H)     Hypovitaminosis D     Anxiety state       Past Medical History:   Diagnosis Date     Contact dermatitis and other eczema, due to unspecified cause     hands     RA (rheumatoid arthritis) (H)     diagnosed 12-15 years ago      Problem (# of Occurrences) Relation (Name,Age of Onset)    C.A.D. (1) Father: CABG, onset heart disease in late 40s or early 50s    Cancer (2) Mother: pancreatic, Paternal Aunt: liver cancer    Neuropathy (1) Sister (melissia): MS        History reviewed. No pertinent surgical history.  Social History      Socioeconomic History     Marital status: Single     Spouse name: None     Number of children: None     Years of education: None     Highest education level: None   Occupational History     None   Social Needs     Financial resource strain: None     Food insecurity:     Worry: None     Inability: None     Transportation needs:     Medical: None     Non-medical: None   Tobacco Use     Smoking status: Current Every Day Smoker     Packs/day: 1.00     Years: 20.00     Pack years: 20.00     Types: Cigarettes     Smokeless tobacco: Never Used   Substance and Sexual Activity     Alcohol use: Yes     Comment: very rarely     Drug use: No     Sexual activity: Yes     Partners: Male     Birth control/protection: Male Surgical     Comment: stopped taking birth control September 2015   Lifestyle     Physical activity:     Days per week: None     Minutes per session: None     Stress: None   Relationships     Social connections:     Talks on phone: None     Gets together: None     Attends Worship service: None     Active member of club or organization: None     Attends meetings of clubs or organizations: None     Relationship status: None     Intimate partner violence:     Fear of current or ex partner: None     Emotionally abused: None     Physically abused: None     Forced sexual activity: None   Other Topics Concern     Parent/sibling w/ CABG, MI or angioplasty before 65F 55M? Yes     Comment: father   Social History Narrative    September 13, 2019    ENVIRONMENTAL HISTORY: The family lives in a [facility] home [place]. The home is heated with a forced air and gas furnace. They do have central air conditioning. The patient's bedroom is furnished with carpeting in bedroom and fabric window coverings.  Pets inside the house include 1 cat and 1 dog. There is no history of cockroach or mice infestation. There is 1 smoker, smokes outside/garage.  The house does not have a damp basement.            Review of Systems    Constitutional: Negative for activity change, chills and fever.   HENT: Negative for congestion, dental problem, ear pain, facial swelling, nosebleeds, postnasal drip, rhinorrhea, sinus pressure and sneezing.    Eyes: Negative for discharge, redness and itching.   Respiratory: Negative for cough, chest tightness, shortness of breath and wheezing.    Cardiovascular: Negative for chest pain.   Gastrointestinal: Negative for diarrhea, nausea and vomiting.   Musculoskeletal: Negative for arthralgias, joint swelling and myalgias.   Skin: Negative for rash.   Neurological: Negative for headaches.   Hematological: Negative for adenopathy.   Psychiatric/Behavioral: Negative for behavioral problems and self-injury.           Current Outpatient Medications:      sertraline (ZOLOFT) 100 MG tablet, Take 2 tablets (200 mg) by mouth daily (Patient taking differently: Take 200 mg by mouth At Bedtime ), Disp: 180 tablet, Rfl: 3     cetirizine (ZYRTEC) 10 MG tablet, Take 10 mg by mouth daily, Disp: , Rfl:      cyclobenzaprine (FLEXERIL) 10 MG tablet, Take 1 tablet (10 mg) by mouth 3 times daily as needed for muscle spasms (Patient not taking: Reported on 9/13/2019), Disp: 30 tablet, Rfl: 3     EPINEPHrine (EPIPEN/ADRENACLICK/OR ANY BX GENERIC EQUIV) 0.3 MG/0.3ML injection 2-pack, Inject 0.3 mLs (0.3 mg) into the muscle as needed for anaphylaxis (Patient not taking: Reported on 9/13/2019), Disp: 1 each, Rfl: 3     hydrOXYzine (ATARAX) 10 MG tablet, Take 1 tablet (10 mg) by mouth 3 times daily as needed for itching (Patient not taking: Reported on 9/13/2019), Disp: 7 tablet, Rfl: 0     ibuprofen (ADVIL/MOTRIN) 200 MG tablet, Take 400 mg by mouth every 4 hours as needed for mild pain, Disp: , Rfl:      ranitidine (ZANTAC) 150 MG tablet, Take 1 tablet (150 mg) by mouth 2 times daily (Patient not taking: Reported on 9/13/2019), Disp: 60 tablet, Rfl: 3  Immunization History   Administered Date(s) Administered     HEPA 04/06/2000,  10/27/2000     HepB 04/06/2000, 05/17/2000, 10/27/2000     HepB-Adult 12/28/2011     Influenza (IIV3) PF 11/07/2006     TD (ADULT, 7+) 01/01/1992, 12/10/2002     Tdap (Adacel,Boostrix) 07/21/2010     No Known Allergies  OBJECTIVE:                                                                 /71 (BP Location: Left arm, Patient Position: Sitting, Cuff Size: Adult Regular)   Pulse 90   Temp 98  F (36.7  C) (Tympanic)   Wt 83.7 kg (184 lb 8.4 oz)   BMI 30.56 kg/m           Physical Exam   Constitutional: She is oriented to person, place, and time. No distress.   HENT:   Head: Normocephalic and atraumatic.   Right Ear: Tympanic membrane, external ear and ear canal normal.   Left Ear: Tympanic membrane, external ear and ear canal normal.   Nose: No mucosal edema or rhinorrhea.   Mouth/Throat: Oropharynx is clear and moist and mucous membranes are normal. No oropharyngeal exudate, posterior oropharyngeal edema or posterior oropharyngeal erythema.   Eyes: Conjunctivae are normal. Right eye exhibits no discharge. Left eye exhibits no discharge.   Neck: Normal range of motion.   Cardiovascular: Normal rate, regular rhythm and normal heart sounds.   No murmur heard.  Pulmonary/Chest: Effort normal and breath sounds normal. No respiratory distress. She has no wheezes. She has no rales.   Musculoskeletal: Normal range of motion.   Lymphadenopathy:     She has no cervical adenopathy.   Neurological: She is alert and oriented to person, place, and time.   Skin: Skin is warm. No rash noted. She is not diaphoretic.   Psychiatric: She has a normal mood and affect. Her behavior is normal.   Nursing note and vitals reviewed.        ASSESSMENT/PLAN:         Visit Diagnoses     Urticaria    -  Primary  For the past 2 weeks, she hasn't had any urticaria, pruritus, or obvious angioedema.   Unclear if eyelid puffiness and sensation that lips are tighter I related to her previous urticaria or angioedema.  -Continue monitoring  her symptoms.  If she starts having them again, she will restart antihistamines.  She also will not need a challenge for NSAIDs.  If she remains asymptomatic, I would suggest an open challenge to aspirin, and then to ibuprofen in the office settings.  The week when she had prior episodes of urticaria, pruritus, and angioedema, she had some cramps.  She remembers taking ibuprofen, but does not remember if there was a direct correlation timewise.  At this point, she is not interested in the challenge.  She states that her symptoms are well controlled with acetaminophen as needed.            Return if symptoms worsen or fail to improve.    Thank you for allowing us to participate in the care of this patient. Please feel free to contact us if there are any questions or concerns about the patient.    Disclaimer: This note consists of symbols derived from keyboarding, dictation and/or voice recognition software. As a result, there may be errors in the script that have gone undetected. Please consider this when interpreting information found in this chart.    Uche Lee MD, FAAAAI, FACAAI  Allergy, Asthma and Immunology  [place], [place] and [place]      Again, thank you for allowing me to participate in the care of your patient.        Sincerely,        Uche Lee MD

## 2019-09-13 NOTE — PROGRESS NOTES
SUBJECTIVE:                                                                   Sofia Garcia presents today to our Allergy Clinic at [facility]; she is being seen for a follow up visit.  As you know, she is a 44 year old female with recurrent urticaria episodes.   She reintroduced red meat without issues.     Component      Latest Ref Rng & Units 7/29/2019 9/3/2019   Tryptase ug/L      <11.0 ug/L 7.3    Galactose alpha 1 3 Galactose IgE      <0.10 kU/L  <0.10       She stopped oral antihistamines 2 weeks ago. She had several episodes when her right upper eyelid appeared swollen in the morning, and as the day would get by, it would resolve.   She didn't have any episodes for the last 5 days. She had several episodes when her lips felt tighter, but didn't appear swollen. She denies episodes of skin pruritus.     She hasn't tried any NSAIDs. She would take acetaminophen as needed.    Patient Active Problem List   Diagnosis     Tobacco use disorder     Arthropathy     Major depression in complete remission (H)     Excessive or frequent menstruation     Encounter for insertion of mirena IUD     RA (rheumatoid arthritis) (H)     Hypovitaminosis D     Anxiety state       Past Medical History:   Diagnosis Date     Contact dermatitis and other eczema, due to unspecified cause     hands     RA (rheumatoid arthritis) (H)     diagnosed 12-15 years ago      Problem (# of Occurrences) Relation (Name,Age of Onset)    C.A.D. (1) Father: CABG, onset heart disease in late 40s or early 50s    Cancer (2) Mother: pancreatic, Paternal Aunt: liver cancer    Neuropathy (1) Sister (mikaia): MS        History reviewed. No pertinent surgical history.  Social History     Socioeconomic History     Marital status: Single     Spouse name: None     Number of children: None     Years of education: None     Highest education level: None   Occupational History     None   Social Needs     Financial resource strain: None     Food  insecurity:     Worry: None     Inability: None     Transportation needs:     Medical: None     Non-medical: None   Tobacco Use     Smoking status: Current Every Day Smoker     Packs/day: 1.00     Years: 20.00     Pack years: 20.00     Types: Cigarettes     Smokeless tobacco: Never Used   Substance and Sexual Activity     Alcohol use: Yes     Comment: very rarely     Drug use: No     Sexual activity: Yes     Partners: Male     Birth control/protection: Male Surgical     Comment: stopped taking birth control September 2015   Lifestyle     Physical activity:     Days per week: None     Minutes per session: None     Stress: None   Relationships     Social connections:     Talks on phone: None     Gets together: None     Attends Congregation service: None     Active member of club or organization: None     Attends meetings of clubs or organizations: None     Relationship status: None     Intimate partner violence:     Fear of current or ex partner: None     Emotionally abused: None     Physically abused: None     Forced sexual activity: None   Other Topics Concern     Parent/sibling w/ CABG, MI or angioplasty before 65F 55M? Yes     Comment: father   Social History Narrative    September 13, 2019    ENVIRONMENTAL HISTORY: The family [place] in a [facility] home [place] The home is heated with a forced air and gas furnace. They do have central air conditioning. The patient's bedroom is furnished with carpeting in bedroom and fabric window coverings.  Pets inside the house include 1 cat and 1 dog. There is no history of cockroach or mice infestation. There is 1 smoker, smokes outside/garage.  The house does not have a damp basement.            Review of Systems   Constitutional: Negative for activity change, chills and fever.   HENT: Negative for congestion, dental problem, ear pain, facial swelling, nosebleeds, postnasal drip, rhinorrhea, sinus pressure and sneezing.    Eyes: Negative for discharge, redness and  itching.   Respiratory: Negative for cough, chest tightness, shortness of breath and wheezing.    Cardiovascular: Negative for chest pain.   Gastrointestinal: Negative for diarrhea, nausea and vomiting.   Musculoskeletal: Negative for arthralgias, joint swelling and myalgias.   Skin: Negative for rash.   Neurological: Negative for headaches.   Hematological: Negative for adenopathy.   Psychiatric/Behavioral: Negative for behavioral problems and self-injury.           Current Outpatient Medications:      sertraline (ZOLOFT) 100 MG tablet, Take 2 tablets (200 mg) by mouth daily (Patient taking differently: Take 200 mg by mouth At Bedtime ), Disp: 180 tablet, Rfl: 3     cetirizine (ZYRTEC) 10 MG tablet, Take 10 mg by mouth daily, Disp: , Rfl:      cyclobenzaprine (FLEXERIL) 10 MG tablet, Take 1 tablet (10 mg) by mouth 3 times daily as needed for muscle spasms (Patient not taking: Reported on 9/13/2019), Disp: 30 tablet, Rfl: 3     EPINEPHrine (EPIPEN/ADRENACLICK/OR ANY BX GENERIC EQUIV) 0.3 MG/0.3ML injection 2-pack, Inject 0.3 mLs (0.3 mg) into the muscle as needed for anaphylaxis (Patient not taking: Reported on 9/13/2019), Disp: 1 each, Rfl: 3     hydrOXYzine (ATARAX) 10 MG tablet, Take 1 tablet (10 mg) by mouth 3 times daily as needed for itching (Patient not taking: Reported on 9/13/2019), Disp: 7 tablet, Rfl: 0     ibuprofen (ADVIL/MOTRIN) 200 MG tablet, Take 400 mg by mouth every 4 hours as needed for mild pain, Disp: , Rfl:      ranitidine (ZANTAC) 150 MG tablet, Take 1 tablet (150 mg) by mouth 2 times daily (Patient not taking: Reported on 9/13/2019), Disp: 60 tablet, Rfl: 3  Immunization History   Administered Date(s) Administered     HEPA 04/06/2000, 10/27/2000     HepB 04/06/2000, 05/17/2000, 10/27/2000     HepB-Adult 12/28/2011     Influenza (IIV3) PF 11/07/2006     TD (ADULT, 7+) 01/01/1992, 12/10/2002     Tdap (Adacel,Boostrix) 07/21/2010     No Known Allergies  OBJECTIVE:                                                                  /71 (BP Location: Left arm, Patient Position: Sitting, Cuff Size: Adult Regular)   Pulse 90   Temp 98  F (36.7  C) (Tympanic)   Wt 83.7 kg (184 lb 8.4 oz)   BMI 30.56 kg/m          Physical Exam   Constitutional: She is oriented to person, place, and time. No distress.   HENT:   Head: Normocephalic and atraumatic.   Right Ear: Tympanic membrane, external ear and ear canal normal.   Left Ear: Tympanic membrane, external ear and ear canal normal.   Nose: No mucosal edema or rhinorrhea.   Mouth/Throat: Oropharynx is clear and moist and mucous membranes are normal. No oropharyngeal exudate, posterior oropharyngeal edema or posterior oropharyngeal erythema.   Eyes: Conjunctivae are normal. Right eye exhibits no discharge. Left eye exhibits no discharge.   Neck: Normal range of motion.   Cardiovascular: Normal rate, regular rhythm and normal heart sounds.   No murmur heard.  Pulmonary/Chest: Effort normal and breath sounds normal. No respiratory distress. She has no wheezes. She has no rales.   Musculoskeletal: Normal range of motion.   Lymphadenopathy:     She has no cervical adenopathy.   Neurological: She is alert and oriented to person, place, and time.   Skin: Skin is warm. No rash noted. She is not diaphoretic.   Psychiatric: She has a normal mood and affect. Her behavior is normal.   Nursing note and vitals reviewed.        ASSESSMENT/PLAN:         Visit Diagnoses     Urticaria    -  Primary  For the past 2 weeks, she hasn't had any urticaria, pruritus, or obvious angioedema.   Unclear if eyelid puffiness and sensation that lips are tighter I related to her previous urticaria or angioedema.  -Continue monitoring her symptoms.  If she starts having them again, she will restart antihistamines.  She also will not need a challenge for NSAIDs.  If she remains asymptomatic, I would suggest an open challenge to aspirin, and then to ibuprofen in the office settings.  The week when  she had prior episodes of urticaria, pruritus, and angioedema, she had some cramps.  She remembers taking ibuprofen, but does not remember if there was a direct correlation timewise.  At this point, she is not interested in the challenge.  She states that her symptoms are well controlled with acetaminophen as needed.            Return if symptoms worsen or fail to improve.    Thank you for allowing us to participate in the care of this patient. Please feel free to contact us if there are any questions or concerns about the patient.    Disclaimer: This note consists of symbols derived from keyboarding, dictation and/or voice recognition software. As a result, there may be errors in the script that have gone undetected. Please consider this when interpreting information found in this chart.    Uche Lee MD, FAAAAI, FACAAI  Allergy, Asthma and Immunology  [place], [place] and [place]

## 2019-11-03 ENCOUNTER — HEALTH MAINTENANCE LETTER (OUTPATIENT)
Age: 44
End: 2019-11-03

## 2020-03-18 ENCOUNTER — MYC MEDICAL ADVICE (OUTPATIENT)
Dept: FAMILY MEDICINE | Facility: CLINIC | Age: 45
End: 2020-03-18

## 2020-03-18 DIAGNOSIS — F32.5 MAJOR DEPRESSION IN COMPLETE REMISSION (H): ICD-10-CM

## 2020-03-19 RX ORDER — SERTRALINE HYDROCHLORIDE 100 MG/1
200 TABLET, FILM COATED ORAL DAILY
Qty: 180 TABLET | Refills: 0 | Status: SHIPPED | OUTPATIENT
Start: 2020-03-19 | End: 2020-06-25

## 2020-03-19 NOTE — TELEPHONE ENCOUNTER
Prescription approved per [facility] Refill Protocol.  Refilled for 90 days, she will be due for OV after restrictions of Corona Virus are lifted.    Adri LEWIS RN

## 2020-06-22 DIAGNOSIS — F32.5 MAJOR DEPRESSION IN COMPLETE REMISSION (H): ICD-10-CM

## 2020-06-23 RX ORDER — SERTRALINE HYDROCHLORIDE 100 MG/1
TABLET, FILM COATED ORAL
Qty: 180 TABLET | Refills: 0 | OUTPATIENT
Start: 2020-06-23

## 2020-06-23 NOTE — TELEPHONE ENCOUNTER
Routing refill request to provider for review/approval because:  Patient needs to be seen because it has been more than 1 year since last office visit.  PHQ9 overdue too.    Last seen 3/18/19.    Therese Cruz RN        PHQ-9 score:    PHQ 3/18/2019   PHQ-9 Total Score 2   Q9: Thoughts of better off dead/self-harm past 2 weeks Not at all

## 2020-06-25 ENCOUNTER — VIRTUAL VISIT (OUTPATIENT)
Dept: FAMILY MEDICINE | Facility: CLINIC | Age: 45
End: 2020-06-25
Payer: COMMERCIAL

## 2020-06-25 DIAGNOSIS — F32.5 MAJOR DEPRESSION IN COMPLETE REMISSION (H): ICD-10-CM

## 2020-06-25 PROCEDURE — 99213 OFFICE O/P EST LOW 20 MIN: CPT | Mod: 95 | Performed by: NURSE PRACTITIONER

## 2020-06-25 RX ORDER — SERTRALINE HYDROCHLORIDE 100 MG/1
200 TABLET, FILM COATED ORAL DAILY
Qty: 180 TABLET | Refills: 3 | Status: SHIPPED | OUTPATIENT
Start: 2020-06-25 | End: 2021-03-29

## 2020-06-25 ASSESSMENT — PATIENT HEALTH QUESTIONNAIRE - PHQ9
5. POOR APPETITE OR OVEREATING: NOT AT ALL
SUM OF ALL RESPONSES TO PHQ QUESTIONS 1-9: 0

## 2020-06-25 ASSESSMENT — ANXIETY QUESTIONNAIRES
6. BECOMING EASILY ANNOYED OR IRRITABLE: SEVERAL DAYS
2. NOT BEING ABLE TO STOP OR CONTROL WORRYING: NOT AT ALL
IF YOU CHECKED OFF ANY PROBLEMS ON THIS QUESTIONNAIRE, HOW DIFFICULT HAVE THESE PROBLEMS MADE IT FOR YOU TO DO YOUR WORK, TAKE CARE OF THINGS AT HOME, OR GET ALONG WITH OTHER PEOPLE: NOT DIFFICULT AT ALL
7. FEELING AFRAID AS IF SOMETHING AWFUL MIGHT HAPPEN: NOT AT ALL
GAD7 TOTAL SCORE: 2
1. FEELING NERVOUS, ANXIOUS, OR ON EDGE: SEVERAL DAYS
3. WORRYING TOO MUCH ABOUT DIFFERENT THINGS: NOT AT ALL
5. BEING SO RESTLESS THAT IT IS HARD TO SIT STILL: NOT AT ALL

## 2020-06-25 NOTE — PROGRESS NOTES
"Sofia Garcia is a 45 year old female who is being evaluated via a billable telephone visit.      The patient has been notified of following:     \"This telephone visit will be conducted via a call between you and your physician/provider. We have found that certain health care needs can be provided without the need for a physical exam.  This service lets us provide the care you need with a short phone conversation.  If a prescription is necessary we can send it directly to your pharmacy.  If lab work is needed we can place an order for that and you can then stop by our lab to have the test done at a later time.    Telephone visits are billed at different rates depending on your insurance coverage. During this emergency period, for some insurers they may be billed the same as an in-person visit.  Please reach out to your insurance provider with any questions.    If during the course of the call the physician/provider feels a telephone visit is not appropriate, you will not be charged for this service.\"    Patient has given verbal consent for Telephone visit?  Yes    What phone number would you like to be contacted at? [phone]    How would you like to obtain your AVS? Abdulkadir Agustin     Sofia Garcia is a 45 year old female who presents via phone visit today for the following health issues:    HPI  Depression and Anxiety Follow-Up    How are you doing with your depression since your last visit? Some situational anxiety    How are you doing with your anxiety since your last visit?  No change    Are you having other symptoms that might be associated with depression or anxiety? No    Have you had a significant life event? No     Do you have any concerns with your use of alcohol or other drugs? No    Social History     Tobacco Use     Smoking status: Current Every Day Smoker     Packs/day: 1.00     Years: 20.00     Pack years: 20.00     Types: Cigarettes     Smokeless tobacco: Never Used   Substance Use " Topics     Alcohol use: Yes     Comment: very rarely     Drug use: No     PHQ 4/17/2017 4/2/2018 3/18/2019   PHQ-9 Total Score 2 3 2   Q9: Thoughts of better off dead/self-harm past 2 weeks Not at all Not at all Not at all     BRENDEN-7 SCORE 4/17/2017 4/2/2018 3/18/2019   Total Score 0 1 2     Last PHQ-9 6/25/2020   1.  Little interest or pleasure in doing things 0   2.  Feeling down, depressed, or hopeless 0   3.  Trouble falling or staying asleep, or sleeping too much 0   4.  Feeling tired or having little energy 0   5.  Poor appetite or overeating 0   6.  Feeling bad about yourself 0   7.  Trouble concentrating 0   8.  Moving slowly or restless 0   Q9: Thoughts of better off dead/self-harm past 2 weeks 0   PHQ-9 Total Score 0   Difficulty at work, home, or with people Not difficult at all     BRENDEN-7  6/25/2020   1. Feeling nervous, anxious, or on edge 1   2. Not being able to stop or control worrying 0   3. Worrying too much about different things 0   4. Trouble relaxing 0   5. Being so restless that it is hard to sit still 0   6. Becoming easily annoyed or irritable 1   7. Feeling afraid, as if something awful might happen 0   BRENDEN-7 Total Score 2   If you checked any problems, how difficult have they made it for you to do your work, take care of things at home, or get along with other people? Not difficult at all     In the past two weeks have you had thoughts of suicide or self-harm?  No.    Do you have concerns about your personal safety or the safety of others?   No    Suicide Assessment Five-step Evaluation and Treatment (SAFE-T)      How many servings of fruits and vegetables do you eat daily?  2-3    On average, how many sweetened beverages do you drink each day (Examples: soda, juice, sweet tea, etc.  Do NOT count diet or artificially sweetened beverages)?   0    How many days per week do you exercise enough to make your heart beat faster? 3 or less    How many minutes a day do you exercise enough to make  your heart beat faster? 9 or less    How many days per week do you miss taking your medication? 0         -------------------------------------    Patient Active Problem List   Diagnosis     Tobacco use disorder     Arthropathy     Major depression in complete remission (H)     Excessive or frequent menstruation     Encounter for insertion of mirena IUD     RA (rheumatoid arthritis) (H)     Hypovitaminosis D     Anxiety state     No past surgical history on file.    Social History     Tobacco Use     Smoking status: Current Every Day Smoker     Packs/day: 1.00     Years: 20.00     Pack years: 20.00     Types: Cigarettes     Smokeless tobacco: Never Used   Substance Use Topics     Alcohol use: Yes     Comment: very rarely     Family History   Problem Relation Age of Onset     Cancer Mother         pancreatic     C.A.D. Father         CABG, onset heart disease in late 40s or early 50s     Neuropathy Sister         MS     Cancer Paternal Aunt         liver cancer           Reviewed and updated as needed this visit by Provider         Review of Systems   Constitutional, HEENT, cardiovascular, pulmonary, GI, , musculoskeletal, neuro, skin, endocrine and psych systems are negative, except as otherwise noted.       Objective   Reported vitals:  There were no vitals taken for this visit.   healthy, alert and no distress  PSYCH: Alert and oriented times 3; coherent speech, normal   rate and volume, able to articulate logical thoughts, able   to abstract reason, no tangential thoughts, no hallucinations   or delusions  Her affect is normal  RESP: No cough, no audible wheezing, able to talk in full sentences  Remainder of exam unable to be completed due to telephone visits    Diagnostic Test Results:  Labs reviewed in Epic        Assessment/Plan:  1. Major depression in complete remission (H)  Well controlled  - sertraline (ZOLOFT) 100 MG tablet; Take 2 tablets (200 mg) by mouth daily  Dispense: 180 tablet; Refill: 3    No  follow-ups on file.      Phone call duration:  5 minutes    BRANNON Cerda CNP

## 2020-06-26 ASSESSMENT — ANXIETY QUESTIONNAIRES: GAD7 TOTAL SCORE: 2

## 2020-11-16 ENCOUNTER — HEALTH MAINTENANCE LETTER (OUTPATIENT)
Age: 45
End: 2020-11-16

## 2020-11-23 ENCOUNTER — OFFICE VISIT (OUTPATIENT)
Dept: FAMILY MEDICINE | Facility: CLINIC | Age: 45
End: 2020-11-23
Payer: COMMERCIAL

## 2020-11-23 VITALS
HEIGHT: 65 IN | SYSTOLIC BLOOD PRESSURE: 120 MMHG | OXYGEN SATURATION: 97 % | WEIGHT: 171 LBS | BODY MASS INDEX: 28.49 KG/M2 | DIASTOLIC BLOOD PRESSURE: 86 MMHG | TEMPERATURE: 98.5 F | HEART RATE: 74 BPM | RESPIRATION RATE: 18 BRPM

## 2020-11-23 DIAGNOSIS — R21 RASH: ICD-10-CM

## 2020-11-23 DIAGNOSIS — B36.9 FUNGAL DERMATITIS: Primary | ICD-10-CM

## 2020-11-23 DIAGNOSIS — Z12.4 SCREENING FOR CERVICAL CANCER: ICD-10-CM

## 2020-11-23 PROCEDURE — 99213 OFFICE O/P EST LOW 20 MIN: CPT | Performed by: FAMILY MEDICINE

## 2020-11-23 PROCEDURE — 87624 HPV HI-RISK TYP POOLED RSLT: CPT | Performed by: FAMILY MEDICINE

## 2020-11-23 PROCEDURE — G0145 SCR C/V CYTO,THINLAYER,RESCR: HCPCS | Performed by: FAMILY MEDICINE

## 2020-11-23 RX ORDER — PRENATAL VIT 91/IRON/FOLIC/DHA 28-975-200
COMBINATION PACKAGE (EA) ORAL 2 TIMES DAILY
Qty: 45 G | Refills: 1 | Status: SHIPPED | OUTPATIENT
Start: 2020-11-23 | End: 2021-02-09

## 2020-11-23 ASSESSMENT — MIFFLIN-ST. JEOR: SCORE: 1427.56

## 2020-11-23 NOTE — PROGRESS NOTES
"Nikole Garcia is a 45 year old female who presents to clinic today for the following health issues:      Patient is a 45 yr old female here for a rash underneath her breast. It has been there for a couple of months. She reports that it is raised and blotchy atimes. There is some associated irritation. She denies any new soaps or detergents.  She also has a rash on her forehead that has been there for months. Rash is shiny flat - atimes itchy. Denies any break in the skin or bleeding.   Patient also due for a pap smear.     HPI    patient is due for a pap      Rash  Onset/Duration: months ago   Description patient has a rash under R breast and forehead has had eczema in the past   Location: forehead and under R breast   Character: blotchy, raised, red  Itching: mild  Intensity:  mild  Progression of Symptoms:  Worsening spreading   Accompanying signs and symptoms:   Fever: no  Body aches or joint pain: no  Sore throat symptoms: no  Recent cold symptoms: no  History:           Previous episodes of similar rash: patient does have an issue with eczema   New exposures:  None  Recent travel: no  Exposure to similar rash:    Precipitating or alleviating factors: none   Therapies tried and outcome: none        Review of Systems   Constitutional, HEENT, cardiovascular, pulmonary, gi and gu systems are negative, except as otherwise noted.      Objective    /86   Pulse 74   Temp 98.5  F (36.9  C) (Tympanic)   Resp 18   Ht 1.645 m (5' 4.75\")   Wt 77.6 kg (171 lb)   SpO2 97%   BMI 28.68 kg/m    Body mass index is 28.68 kg/m .  Physical Exam  Constitutional:       Appearance: Normal appearance.   HENT:      Head: Normocephalic and atraumatic.   Cardiovascular:      Rate and Rhythm: Normal rate and regular rhythm.      Pulses: Normal pulses.      Heart sounds: Normal heart sounds.   Pulmonary:      Effort: Pulmonary effort is normal.      Breath sounds: Normal breath sounds.   Abdominal:      " General: Abdomen is flat. There is no distension.      Palpations: Abdomen is soft. There is no mass.      Tenderness: There is no abdominal tenderness. There is no right CVA tenderness or guarding.      Hernia: No hernia is present.   Genitourinary:     Vagina: Normal.      Cervix: Normal.      Comments: Pap smear obtained.  Skin:     Findings: Lesion and rash present.             Comments: Rash on forehead- shiny flat. ? Basal carcinoma.     Rash underneath breast- tan, round in shape.   Neurological:      Mental Status: She is alert and oriented to person, place, and time.   Psychiatric:         Mood and Affect: Mood normal.         Behavior: Behavior normal.                    Assessment & Plan     Sofia was seen today for gyn exam and derm problem.    Diagnoses and all orders for this visit:    Fungal dermatitis  -     terbinafine (LAMISIL) 1 % external cream; Apply topically 2 times daily  -  Appears fungal , recommend antifungal cream. Patient to call if rash does not clear.     Rash  -     DERMATOLOGY ADULT REFERRAL; Future  -      Rash concerning for basal cell carcinoma. Recommend seeing dermatology.      Screening for cervical cancer  -     Pap imaged thin layer screen with HPV - recommended age 30 - 65 years (select HPV order below)  -     HPV High Risk Types DNA Cervical  -     Patient will be notified of results.                   FUTURE APPOINTMENTS:       - Follow-up visit in one month or sooner as needed.    Return in about 4 weeks (around 12/21/2020) for Follow up.    Zac Farmer MD  [facility]

## 2020-11-25 LAB
COPATH REPORT: NORMAL
PAP: NORMAL

## 2020-11-27 LAB
FINAL DIAGNOSIS: NORMAL
HPV HR 12 DNA CVX QL NAA+PROBE: NEGATIVE
HPV16 DNA SPEC QL NAA+PROBE: NEGATIVE
HPV18 DNA SPEC QL NAA+PROBE: NEGATIVE
SPECIMEN DESCRIPTION: NORMAL
SPECIMEN SOURCE CVX/VAG CYTO: NORMAL

## 2020-12-01 ENCOUNTER — OFFICE VISIT (OUTPATIENT)
Dept: DERMATOLOGY | Facility: CLINIC | Age: 45
End: 2020-12-01
Attending: FAMILY MEDICINE
Payer: COMMERCIAL

## 2020-12-01 VITALS — HEART RATE: 84 BPM | DIASTOLIC BLOOD PRESSURE: 75 MMHG | OXYGEN SATURATION: 99 % | SYSTOLIC BLOOD PRESSURE: 125 MMHG

## 2020-12-01 DIAGNOSIS — R21 RASH: ICD-10-CM

## 2020-12-01 DIAGNOSIS — L82.0 INFLAMED SEBORRHEIC KERATOSIS: ICD-10-CM

## 2020-12-01 DIAGNOSIS — L30.9 DERMATITIS: Primary | ICD-10-CM

## 2020-12-01 PROCEDURE — 99203 OFFICE O/P NEW LOW 30 MIN: CPT | Performed by: DERMATOLOGY

## 2020-12-01 RX ORDER — DESONIDE 0.5 MG/G
CREAM TOPICAL 2 TIMES DAILY
Qty: 60 G | Refills: 3 | Status: SHIPPED | OUTPATIENT
Start: 2020-12-01 | End: 2021-02-09

## 2020-12-01 NOTE — NURSING NOTE
Chief Complaint   Patient presents with     Derm Problem     spot on forehead       Vitals:    12/01/20 0949   BP: 125/75   BP Location: Left arm   Patient Position: Sitting   Cuff Size: Adult Regular   Pulse: 84   SpO2: 99%     Wt Readings from Last 1 Encounters:   11/23/20 77.6 kg (171 lb)       Sofia Constantino LPN.................12/1/2020

## 2020-12-01 NOTE — PROGRESS NOTES
Sofia Garcia is an extremely pleasant 45 year old year old female patient here today for rash on scalp.   .  Patient states this has been present for months.  Patient reports the following symptoms:  itching.  Patient reports the following previous treatments none.  These treatments did not work.  Patient reports the following modifying factors none.  Associated symptoms: none.  Patient has no other skin complaints today.  Remainder of the HPI, Meds, PMH, Allergies, FH, and SH was reviewed in chart.      Past Medical History:   Diagnosis Date     Contact dermatitis and other eczema, due to unspecified cause     hands     RA (rheumatoid arthritis) (H)     diagnosed 12-15 years ago       History reviewed. No pertinent surgical history.     Family History   Problem Relation Age of Onset     Cancer Mother         pancreatic     C.A.D. Father         CABG, onset heart disease in late 40s or early 50s     Neuropathy Sister         MS     Cancer Paternal Aunt         liver cancer       Social History     Socioeconomic History     Marital status: Single     Spouse name: Not on file     Number of children: Not on file     Years of education: Not on file     Highest education level: Not on file   Occupational History     Not on file   Social Needs     Financial resource strain: Not on file     Food insecurity     Worry: Not on file     Inability: Not on file     Transportation needs     Medical: Not on file     Non-medical: Not on file   Tobacco Use     Smoking status: Current Every Day Smoker     Packs/day: 1.00     Years: 20.00     Pack years: 20.00     Types: Cigarettes     Smokeless tobacco: Never Used   Substance and Sexual Activity     Alcohol use: Yes     Comment: very rarely     Drug use: No     Sexual activity: Yes     Partners: Male     Birth control/protection: Male Surgical     Comment: stopped taking birth control September 2015   Lifestyle     Physical activity     Days per week: Not on file     Minutes per  session: Not on file     Stress: Not on file   Relationships     Social connections     Talks on phone: Not on file     Gets together: Not on file     Attends [employer] service: Not on file     Active member of club or organization: Not on file     Attends meetings of clubs or organizations: Not on file     Relationship status: Not on file     Intimate partner violence     Fear of current or ex partner: Not on file     Emotionally abused: Not on file     Physically abused: Not on file     Forced sexual activity: Not on file   Other Topics Concern     Parent/sibling w/ CABG, MI or angioplasty before 65F 55M? Yes     Comment: father   Social History Narrative    September 13, 2019    ENVIRONMENTAL HISTORY: [place] The home is heated with a forced air and gas furnace. They do have central air conditioning. The patient's bedroom is furnished with carpeting in bedroom and fabric window coverings.  Pets inside the house include 1 cat and 1 dog. There is no history of cockroach or mice infestation. There is 1 smoker, smokes outside/garage.  The house does not have a damp basement.        Outpatient Encounter Medications as of 12/1/2020   Medication Sig Dispense Refill     cetirizine (ZYRTEC) 10 MG tablet Take 10 mg by mouth daily       EPINEPHrine (EPIPEN/ADRENACLICK/OR ANY BX GENERIC EQUIV) 0.3 MG/0.3ML injection 2-pack Inject 0.3 mLs (0.3 mg) into the muscle as needed for anaphylaxis 1 each 3     ibuprofen (ADVIL/MOTRIN) 200 MG tablet Take 400 mg by mouth every 4 hours as needed for mild pain       sertraline (ZOLOFT) 100 MG tablet Take 2 tablets (200 mg) by mouth daily 180 tablet 3     terbinafine (LAMISIL) 1 % external cream Apply topically 2 times daily 45 g 1     No facility-administered encounter medications on file as of 12/1/2020.              Review Of Systems  Skin: As above  Eyes: negative  Ears/Nose/Throat: negative  Respiratory: No shortness of breath, dyspnea  on exertion, cough, or hemoptysis  Cardiovascular: negative  Gastrointestinal: negative  Genitourinary: negative  Musculoskeletal: negative  Neurologic: negative  Psychiatric: negative  Hematologic/Lymphatic/Immunologic: negative  Endocrine: negative      O:   NAD, WDWN, Alert & Oriented, Mood & Affect wnl, Vitals stable   Here today alone   /75 (BP Location: Left arm, Patient Position: Sitting, Cuff Size: Adult Regular)   Pulse 84   SpO2 99%    General appearance normal   Vitals stable   Alert, oriented and in no acute distress     Mid foehead eczematous patch and inflamed seborrheic keratosis      The remainder of expanded problem focused exam was normal; the following areas were examined:  scalp/hair, conjunctiva/lids, face, neck, , chest, digits/nails,       Eyes: Conjunctivae/lids:Normal     ENT: Lips, buccal mucosa, tongue: normal    MSK:Normal    Cardiovascular: peripheral edema none    Pulm: Breathing Normal    Neuro/Psych: Orientation:Alert and Orientedx3 ; Mood/Affect:normal       A/P:  1. Inflamed seborrheic keratosis and dermatitis  Desonide twice daily    It was a pleasure speaking to Sofia Garcia today.  BENIGN LESIONS DISCUSSED WITH PATIENT:  I discussed the specifics of tumor, prognosis, and genetics of benign lesions.  I explained that treatment of these lesions would be purely cosmetic and not medically neccessary.  I discussed with patient different removal options including excision, cautery and /or laser.      Nature and genetics of benign skin lesions dicussed with patient.  Skin care regimen reviewed with patient: Eliminate harsh soaps, i.e. Dial, zest, irsih spring; Mild soaps such as Cetaphil or Dove sensitive skin, avoid hot or cold showers, aggressive use of emollients including vanicream, cetaphil or cerave discussed with patient.    Return to clinic prn

## 2020-12-01 NOTE — LETTER
12/1/2020         RE: Sofia Garcia  [id number] [facility] [address]        Dear Colleague,    Thank you for referring your patient, Sofia Garcia, to the [facility]. Please see a copy of my visit note below.    Sofia Garcia is an extremely pleasant 45 year old year old female patient here today for rash on scalp.   .  Patient states this has been present for months.  Patient reports the following symptoms:  itching.  Patient reports the following previous treatments none.  These treatments did not work.  Patient reports the following modifying factors none.  Associated symptoms: none.  Patient has no other skin complaints today.  Remainder of the HPI, Meds, PMH, Allergies, FH, and SH was reviewed in chart.      Past Medical History:   Diagnosis Date     Contact dermatitis and other eczema, due to unspecified cause     hands     RA (rheumatoid arthritis) (H)     diagnosed 12-15 years ago       History reviewed. No pertinent surgical history.     Family History   Problem Relation Age of Onset     Cancer Mother         pancreatic     C.A.D. Father         CABG, onset heart disease in late 40s or early 50s     Neuropathy Sister         MS     Cancer Paternal Aunt         liver cancer       Social History     Socioeconomic History     Marital status: Single     Spouse name: Not on file     Number of children: Not on file     Years of education: Not on file     Highest education level: Not on file   Occupational History     Not on file   Social Needs     Financial resource strain: Not on file     Food insecurity     Worry: Not on file     Inability: Not on file     Transportation needs     Medical: Not on file     Non-medical: Not on file   Tobacco Use     Smoking status: Current Every Day Smoker     Packs/day: 1.00     Years: 20.00     Pack years: 20.00     Types: Cigarettes     Smokeless tobacco: Never Used   Substance and Sexual Activity     Alcohol use: Yes     Comment:  very rarely     Drug use: No     Sexual activity: Yes     Partners: Male     Birth control/protection: Male Surgical     Comment: stopped taking birth control September 2015   Lifestyle     Physical activity     Days per week: Not on file     Minutes per session: Not on file     Stress: Not on file   Relationships     Social connections     Talks on phone: Not on file     Gets together: Not on file     Attends [employer] service: Not on file     Active member of club or organization: Not on file     Attends meetings of clubs or organizations: Not on file     Relationship status: Not on file     Intimate partner violence     Fear of current or ex partner: Not on file     Emotionally abused: Not on file     Physically abused: Not on file     Forced sexual activity: Not on file   Other Topics Concern     Parent/sibling w/ CABG, MI or angioplasty before 65F 55M? Yes     Comment: father   Social History Narrative    September 13, 2019    ENVIRONMENTAL HISTORY: [place] The home is heated with a forced air and gas furnace. They do have central air conditioning. The patient's bedroom is furnished with carpeting in bedroom and fabric window coverings.  Pets inside the house include 1 cat and 1 dog. There is no history of cockroach or mice infestation. There is 1 smoker, smokes outside/garage.  The house does not have a damp basement.        Outpatient Encounter Medications as of 12/1/2020   Medication Sig Dispense Refill     cetirizine (ZYRTEC) 10 MG tablet Take 10 mg by mouth daily       EPINEPHrine (EPIPEN/ADRENACLICK/OR ANY BX GENERIC EQUIV) 0.3 MG/0.3ML injection 2-pack Inject 0.3 mLs (0.3 mg) into the muscle as needed for anaphylaxis 1 each 3     ibuprofen (ADVIL/MOTRIN) 200 MG tablet Take 400 mg by mouth every 4 hours as needed for mild pain       sertraline (ZOLOFT) 100 MG tablet Take 2 tablets (200 mg) by mouth daily 180 tablet 3     terbinafine (LAMISIL) 1 % external  cream Apply topically 2 times daily 45 g 1     No facility-administered encounter medications on file as of 12/1/2020.              Review Of Systems  Skin: As above  Eyes: negative  Ears/Nose/Throat: negative  Respiratory: No shortness of breath, dyspnea on exertion, cough, or hemoptysis  Cardiovascular: negative  Gastrointestinal: negative  Genitourinary: negative  Musculoskeletal: negative  Neurologic: negative  Psychiatric: negative  Hematologic/Lymphatic/Immunologic: negative  Endocrine: negative      O:   NAD, WDWN, Alert & Oriented, Mood & Affect wnl, Vitals stable   Here today alone   /75 (BP Location: Left arm, Patient Position: Sitting, Cuff Size: Adult Regular)   Pulse 84   SpO2 99%    General appearance normal   Vitals stable   Alert, oriented and in no acute distress     Mid foehead eczematous patch and inflamed seborrheic keratosis      The remainder of expanded problem focused exam was normal; the following areas were examined:  scalp/hair, conjunctiva/lids, face, neck, , chest, digits/nails,       Eyes: Conjunctivae/lids:Normal     ENT: Lips, buccal mucosa, tongue: normal    MSK:Normal    Cardiovascular: peripheral edema none    Pulm: Breathing Normal    Neuro/Psych: Orientation:Alert and Orientedx3 ; Mood/Affect:normal       A/P:  1. Inflamed seborrheic keratosis and dermatitis  Desonide twice daily    It was a pleasure speaking to Sofia Garcia today.  BENIGN LESIONS DISCUSSED WITH PATIENT:  I discussed the specifics of tumor, prognosis, and genetics of benign lesions.  I explained that treatment of these lesions would be purely cosmetic and not medically neccessary.  I discussed with patient different removal options including excision, cautery and /or laser.      Nature and genetics of benign skin lesions dicussed with patient.  Skin care regimen reviewed with patient: Eliminate harsh soaps, i.e. Dial, zest, irsih spring; Mild soaps such as Cetaphil or Dove sensitive skin, avoid hot or  cold showers, aggressive use of emollients including vanicream, cetaphil or cerave discussed with patient.    Return to clinic prn         Again, thank you for allowing me to participate in the care of your patient.        Sincerely,        Dylan Boogie MD

## 2021-02-09 ENCOUNTER — OFFICE VISIT (OUTPATIENT)
Dept: FAMILY MEDICINE | Facility: CLINIC | Age: 46
End: 2021-02-09
Payer: COMMERCIAL

## 2021-02-09 VITALS
OXYGEN SATURATION: 97 % | TEMPERATURE: 97.4 F | HEART RATE: 73 BPM | DIASTOLIC BLOOD PRESSURE: 70 MMHG | SYSTOLIC BLOOD PRESSURE: 122 MMHG | BODY MASS INDEX: 30.32 KG/M2 | WEIGHT: 180.8 LBS | RESPIRATION RATE: 16 BRPM

## 2021-02-09 DIAGNOSIS — Z23 NEED FOR VACCINATION: ICD-10-CM

## 2021-02-09 DIAGNOSIS — M06.9 RHEUMATOID ARTHRITIS OF WRIST, UNSPECIFIED LATERALITY, UNSPECIFIED WHETHER RHEUMATOID FACTOR PRESENT (H): ICD-10-CM

## 2021-02-09 DIAGNOSIS — F32.5 MAJOR DEPRESSION IN COMPLETE REMISSION (H): ICD-10-CM

## 2021-02-09 DIAGNOSIS — K64.4 RESIDUAL HEMORRHOIDAL SKIN TAGS: Primary | ICD-10-CM

## 2021-02-09 PROCEDURE — 99214 OFFICE O/P EST MOD 30 MIN: CPT | Mod: 25 | Performed by: FAMILY MEDICINE

## 2021-02-09 PROCEDURE — 90471 IMMUNIZATION ADMIN: CPT | Performed by: FAMILY MEDICINE

## 2021-02-09 PROCEDURE — 90715 TDAP VACCINE 7 YRS/> IM: CPT | Performed by: FAMILY MEDICINE

## 2021-02-09 ASSESSMENT — PATIENT HEALTH QUESTIONNAIRE - PHQ9
SUM OF ALL RESPONSES TO PHQ QUESTIONS 1-9: 2
5. POOR APPETITE OR OVEREATING: NOT AT ALL

## 2021-02-09 ASSESSMENT — ANXIETY QUESTIONNAIRES
7. FEELING AFRAID AS IF SOMETHING AWFUL MIGHT HAPPEN: NOT AT ALL
IF YOU CHECKED OFF ANY PROBLEMS ON THIS QUESTIONNAIRE, HOW DIFFICULT HAVE THESE PROBLEMS MADE IT FOR YOU TO DO YOUR WORK, TAKE CARE OF THINGS AT HOME, OR GET ALONG WITH OTHER PEOPLE: NOT DIFFICULT AT ALL
2. NOT BEING ABLE TO STOP OR CONTROL WORRYING: NOT AT ALL
3. WORRYING TOO MUCH ABOUT DIFFERENT THINGS: NOT AT ALL
6. BECOMING EASILY ANNOYED OR IRRITABLE: SEVERAL DAYS
1. FEELING NERVOUS, ANXIOUS, OR ON EDGE: SEVERAL DAYS
5. BEING SO RESTLESS THAT IT IS HARD TO SIT STILL: NOT AT ALL
GAD7 TOTAL SCORE: 2

## 2021-02-09 NOTE — PATIENT INSTRUCTIONS
Patient Education     Treating Hemorrhoids: Self-Care     Follow your healthcare provider s advice about caring for your hemorrhoids at home. Some treatments help relieve symptoms right away. Others involve making changes in your diet and exercise habits. These can help ease constipation and prevent hemorrhoids from coming back.  Relieving symptoms  Your healthcare provider may prescribe anti-inflammatory medicine to help ease your symptoms. These tips will also help relieve pain and swelling.    Take sitz baths. Taking a sitz bath means sitting in a few inches of warm bath water. Soaking for 10 minutes twice a day can provide relief from painful hemorrhoids. It can also help the area stay clean.    Develop good bowel habits. Use the bathroom when you need to. Don t ignore the urge to move your bowels. This can lead to constipation, hard stools, and straining. Also, don t read while on the toilet. Sit only as long as needed. Wipe gently with soft, unscented toilet tissue or baby wipes.    Use ice packs. Placing an ice pack on an external hemorrhoid can help relieve pain right away. It will also help reduce the blood clot. Use the ice for 15 to 20 minutes at a time. Keep a cloth between the ice and your skin to prevent skin damage.    Use other measures. Laxatives and enemas can help ease constipation. But use them only on your healthcare provider s advice. For symptom relief, try using cotton pads soaked in witch hazel. These are available at most drugstores. Over-the-counter hemorrhoid ointments and petroleum jelly can also provide relief.  Add fiber to your diet  Adding fiber to your diet can help relieve constipation by making stools softer and easier to pass. To increase your fiber intake, your healthcare provider may recommend a bulking agent, such as psyllium. This is a high-fiber supplement available at most grocery stores and drugstores. Eating more fiber-rich foods will also help. There are two types of  fiber:    Insoluble fiber is the main ingredient in bulking agents. It s also found in foods such as wheat bran, whole-grain breads, fresh fruits, and vegetables.    Soluble fiber is found in foods such as oat bran. Although soluble fiber is good for you, it may not ease constipation as much as foods high in insoluble fiber.  Drink more water  Along with a high-fiber diet, drinking more water can help ease constipation. This is because insoluble fiber absorbs water, making stools soft and bulky. Be sure to drink plenty of water throughout the day. Drinking fruit juices, such as prune juice or apple juice, can also help prevent constipation.  Get more exercise  Regular exercise aids digestion and helps prevent constipation. It s also great for your health. So talk with your healthcare provider about starting an exercise program. Low-impact activities, such as swimming or walking, are good places to start. Take it easy at first. And remember to drink plenty of water when you exercise.  High-fiber foods Easy ways to add fiber  High-fiber foods offer many benefits. By making your stools softer, they help heal and prevent swollen hemorrhoids. They may also help reduce the risk of colon and rectal cancer. Best of all, they re usually low in calories and taste great. Here are some examples of fiber-rich foods.    Whole grains, such as wheat bran, corn bran, and brown rice    Vegetables, especially carrots, broccoli, cabbage, and peas    Fruits, such as apples, bananas, raisins, peaches, prunes, and pears    Nuts and legumes, especially peanuts, lentils, and kidney beans  Easy ways to add fiber  The tips below offer some simple ways to add more high-fiber foods to your meals.    Start your day with a high-fiber breakfast. Eat a wheat bran cereal along with a sliced banana. Or, try peanut butter on whole-wheat toast.    Eat carrot sticks for snacks. They re easy to prepare, taste great, and are low in calories.    Use  whole-grain breads instead of white bread for sandwiches.    Eat fruits for treats. Try an apple and some raisins instead of a candy bar.  [employer] last reviewed this educational content on 6/1/2019 2000-2020 [employer] [address] [place] [address], [place] [address]. All rights reserved. This information is not intended as a substitute for professional medical care. Always follow your healthcare professional's instructions.

## 2021-02-09 NOTE — PROGRESS NOTES
Assessment & Plan     Residual hemorrhoidal skin tags  Patient was reassured, recommend hemorrhoidal creams and TUCKs wipes.     Need for vaccination  - TDAP VACCINE (Adacel, Boostrix)  [[id number]]    Rheumatoid arthritis of wrist, unspecified laterality, unspecified whether rheumatoid factor present (H)  Stable     Major depression in complete remission (H)  Stable       [id number]}        FUTURE APPOINTMENTS:       - Follow-up visit in one month or sooner as needed.  Patient Instructions     Patient Education     Treating Hemorrhoids: Self-Care     Follow your healthcare provider s advice about caring for your hemorrhoids at home. Some treatments help relieve symptoms right away. Others involve making changes in your diet and exercise habits. These can help ease constipation and prevent hemorrhoids from coming back.  Relieving symptoms  Your healthcare provider may prescribe anti-inflammatory medicine to help ease your symptoms. These tips will also help relieve pain and swelling.    Take sitz baths. Taking a sitz bath means sitting in a few inches of warm bath water. Soaking for 10 minutes twice a day can provide relief from painful hemorrhoids. It can also help the area stay clean.    Develop good bowel habits. Use the bathroom when you need to. Don t ignore the urge to move your bowels. This can lead to constipation, hard stools, and straining. Also, don t read while on the toilet. Sit only as long as needed. Wipe gently with soft, unscented toilet tissue or baby wipes.    Use ice packs. Placing an ice pack on an external hemorrhoid can help relieve pain right away. It will also help reduce the blood clot. Use the ice for 15 to 20 minutes at a time. Keep a cloth between the ice and your skin to prevent skin damage.    Use other measures. Laxatives and enemas can help ease constipation. But use them only on your healthcare provider s advice. For symptom relief, try using cotton pads soaked in witch hazel. These are  available at most drugstores. Over-the-counter hemorrhoid ointments and petroleum jelly can also provide relief.  Add fiber to your diet  Adding fiber to your diet can help relieve constipation by making stools softer and easier to pass. To increase your fiber intake, your healthcare provider may recommend a bulking agent, such as psyllium. This is a high-fiber supplement available at most grocery stores and drugstores. Eating more fiber-rich foods will also help. There are two types of fiber:    Insoluble fiber is the main ingredient in bulking agents. It s also found in foods such as wheat bran, whole-grain breads, fresh fruits, and vegetables.    Soluble fiber is found in foods such as oat bran. Although soluble fiber is good for you, it may not ease constipation as much as foods high in insoluble fiber.  Drink more water  Along with a high-fiber diet, drinking more water can help ease constipation. This is because insoluble fiber absorbs water, making stools soft and bulky. Be sure to drink plenty of water throughout the day. Drinking fruit juices, such as prune juice or apple juice, can also help prevent constipation.  Get more exercise  Regular exercise aids digestion and helps prevent constipation. It s also great for your health. So talk with your healthcare provider about starting an exercise program. Low-impact activities, such as swimming or walking, are good places to start. Take it easy at first. And remember to drink plenty of water when you exercise.  High-fiber foods Easy ways to add fiber  High-fiber foods offer many benefits. By making your stools softer, they help heal and prevent swollen hemorrhoids. They may also help reduce the risk of colon and rectal cancer. Best of all, they re usually low in calories and taste great. Here are some examples of fiber-rich foods.    Whole grains, such as wheat bran, corn bran, and brown rice    Vegetables, especially carrots, broccoli, cabbage, and  peas    Fruits, such as apples, bananas, raisins, peaches, prunes, and pears    Nuts and legumes, especially peanuts, lentils, and kidney beans  Easy ways to add fiber  The tips below offer some simple ways to add more high-fiber foods to your meals.    Start your day with a high-fiber breakfast. Eat a wheat bran cereal along with a sliced banana. Or, try peanut butter on whole-wheat toast.    Eat carrot sticks for snacks. They re easy to prepare, taste great, and are low in calories.    Use whole-grain breads instead of white bread for sandwiches.    Eat fruits for treats. Try an apple and some raisins instead of a candy bar.  [employer] last reviewed this educational content on 6/1/2019 2000-2020 [employer]. [address] [place] [address]. All rights reserved. This information is not intended as a substitute for professional medical care. Always follow your healthcare professional's instructions.               No follow-ups on file.    Zac Farmer MD  [facility]    Nikole Black is a 45 year old who presents to clinic today for the following health issues     HPI   45 yr old female here for possible hemorrhoids. Symptoms started lasted spring. She reports that she started having anal itching and she felt it may have been because they were using different types of toilet paper at the time     She has noticed  occasional blood on tissue when she wipes. This last weekend she noticed a lump which was a bit irritated. No blood in her stool or in the toilet. She reports no constipation and reports that she is quite regular.    Hemorrhoids  Onset/Duration:   Description:   Caroline-anal lump: YES, recently   Pain: burning after BM at times  Itching: YES  Accompanying Signs & Symptoms:  Blood in stool:  Possible some blood when wiping  Changes in stool pattern: no  History:   Any previous GI studies done:none  Family History of colon cancer:  no  Precipitating factors:   None  Alleviating factors:  None  Therapies tried and outcome: none        Review of Systems   Constitutional, HEENT, cardiovascular, pulmonary, gi and gu systems are negative, except as otherwise noted.      Objective    /70   Pulse 73   Temp 97.4  F (36.3  C) (Tympanic)   Resp 16   Wt 82 kg (180 lb 12.8 oz)   SpO2 97%   BMI 30.32 kg/m    Body mass index is 30.32 kg/m .  Physical Exam   GENERAL: healthy, alert and no distress  NECK: no adenopathy, no asymmetry, masses, or scars and thyroid normal to palpation  RESP: lungs clear to auscultation - no rales, rhonchi or wheezes  CV: regular rate and rhythm, normal S1 S2, no S3 or S4, no murmur, click or rub, no peripheral edema and peripheral pulses strong  ABDOMEN: soft, nontender, no hepatosplenomegaly, no masses and bowel sounds normal  RECTAL (female): normal sphincter tone, no rectal masses and hemorrhoid skin tag

## 2021-02-10 ASSESSMENT — ANXIETY QUESTIONNAIRES: GAD7 TOTAL SCORE: 2

## 2021-03-29 ENCOUNTER — VIRTUAL VISIT (OUTPATIENT)
Dept: FAMILY MEDICINE | Facility: CLINIC | Age: 46
End: 2021-03-29
Payer: COMMERCIAL

## 2021-03-29 DIAGNOSIS — F32.5 MAJOR DEPRESSION IN COMPLETE REMISSION (H): ICD-10-CM

## 2021-03-29 PROCEDURE — 99213 OFFICE O/P EST LOW 20 MIN: CPT | Mod: TEL | Performed by: NURSE PRACTITIONER

## 2021-03-29 PROCEDURE — 96127 BRIEF EMOTIONAL/BEHAV ASSMT: CPT | Mod: 95 | Performed by: NURSE PRACTITIONER

## 2021-03-29 RX ORDER — SERTRALINE HYDROCHLORIDE 100 MG/1
200 TABLET, FILM COATED ORAL DAILY
Qty: 180 TABLET | Refills: 3 | Status: SHIPPED | OUTPATIENT
Start: 2021-03-29 | End: 2022-03-24

## 2021-03-29 ASSESSMENT — PATIENT HEALTH QUESTIONNAIRE - PHQ9
5. POOR APPETITE OR OVEREATING: NOT AT ALL
SUM OF ALL RESPONSES TO PHQ QUESTIONS 1-9: 0

## 2021-03-29 ASSESSMENT — ANXIETY QUESTIONNAIRES
2. NOT BEING ABLE TO STOP OR CONTROL WORRYING: NOT AT ALL
GAD7 TOTAL SCORE: 0
1. FEELING NERVOUS, ANXIOUS, OR ON EDGE: NOT AT ALL
7. FEELING AFRAID AS IF SOMETHING AWFUL MIGHT HAPPEN: NOT AT ALL
5. BEING SO RESTLESS THAT IT IS HARD TO SIT STILL: NOT AT ALL
IF YOU CHECKED OFF ANY PROBLEMS ON THIS QUESTIONNAIRE, HOW DIFFICULT HAVE THESE PROBLEMS MADE IT FOR YOU TO DO YOUR WORK, TAKE CARE OF THINGS AT HOME, OR GET ALONG WITH OTHER PEOPLE: NOT DIFFICULT AT ALL
6. BECOMING EASILY ANNOYED OR IRRITABLE: NOT AT ALL
3. WORRYING TOO MUCH ABOUT DIFFERENT THINGS: NOT AT ALL

## 2021-03-29 NOTE — PROGRESS NOTES
"Sofia is a 46 year old who is being evaluated via a billable telephone visit.      What phone number would you like to be contacted at? [phone]  How would you like to obtain your AVS? [facility]    Assessment & Plan   Problem List Items Addressed This Visit     Major depression in complete remission (H)    Relevant Medications    sertraline (ZOLOFT) 100 MG tablet           [id number]}     Tobacco Cessation:   reports that she has been smoking cigarettes. She has a 20.00 pack-year smoking history. She has never used smokeless tobacco.      BMI:   Estimated body mass index is 30.32 kg/m  as calculated from the following:    Height as of 11/23/20: 1.645 m (5' 4.75\").    Weight as of 2/9/21: 82 kg (180 lb 12.8 oz).       No follow-ups on file.    BRANNON Cerda CNP  [facility]    Subjective   Sofia is a 46 year old who presents for the following health issues     HPI     Depression and Anxiety Follow-Up    How are you doing with your depression since your last visit? No change, Mellow with the medication.     How are you doing with your anxiety since your last visit?  No change. Mellow with the medication     Are you having other symptoms that might be associated with depression or anxiety? No    Have you had a significant life event? No     Do you have any concerns with your use of alcohol or other drugs? No    Social History     Tobacco Use     Smoking status: Current Every Day Smoker     Packs/day: 1.00     Years: 20.00     Pack years: 20.00     Types: Cigarettes     Smokeless tobacco: Never Used   Substance Use Topics     Alcohol use: Yes     Comment: very rarely     Drug use: No     PHQ 3/18/2019 6/25/2020 2/9/2021   PHQ-9 Total Score 2 0 2   Q9: Thoughts of better off dead/self-harm past 2 weeks Not at all Not at all Not at all     BRENDEN-7 SCORE 3/18/2019 6/25/2020 2/9/2021   Total Score 2 2 2     Last PHQ-9 3/29/2021   1.  Little interest or pleasure in doing things 0   2.  Feeling down, " depressed, or hopeless 0   3.  Trouble falling or staying asleep, or sleeping too much 0   4.  Feeling tired or having little energy 0   5.  Poor appetite or overeating 0   6.  Feeling bad about yourself 0   7.  Trouble concentrating 0   8.  Moving slowly or restless 0   Q9: Thoughts of better off dead/self-harm past 2 weeks 0   PHQ-9 Total Score 0   Difficulty at work, home, or with people Not difficult at all     BRENDEN-7  3/29/2021   1. Feeling nervous, anxious, or on edge 0   2. Not being able to stop or control worrying 0   3. Worrying too much about different things 0   4. Trouble relaxing 0   5. Being so restless that it is hard to sit still 0   6. Becoming easily annoyed or irritable 0   7. Feeling afraid, as if something awful might happen 0   BRENDEN-7 Total Score 0   If you checked any problems, how difficult have they made it for you to do your work, take care of things at home, or get along with other people? Not difficult at all       Suicide Assessment Five-step Evaluation and Treatment (SAFE-T)      How many servings of fruits and vegetables do you eat daily?  2-3    On average, how many sweetened beverages do you drink each day (Examples: soda, juice, sweet tea, etc.  Do NOT count diet or artificially sweetened beverages)?   0    How many days per week do you exercise enough to make your heart beat faster? 4    How many minutes a day do you exercise enough to make your heart beat faster? 60 or more    How many days per week do you miss taking your medication? 0    PHQ 6/25/2020 2/9/2021 3/29/2021   PHQ-9 Total Score 0 2 0   Q9: Thoughts of better off dead/self-harm past 2 weeks Not at all Not at all Not at all     BRENDEN-7 SCORE 6/25/2020 2/9/2021 3/29/2021   Total Score 2 2 0     Patient denies side effects from the medication.         Review of Systems   Constitutional, HEENT, cardiovascular, pulmonary, GI, , musculoskeletal, neuro, skin, endocrine and psych systems are negative, except as otherwise  noted.      Objective           Vitals:  No vitals were obtained today due to virtual visit.    Physical Exam   healthy, alert and no distress  PSYCH: Alert and oriented times 3; coherent speech, normal   rate and volume, able to articulate logical thoughts, able   to abstract reason, no tangential thoughts, no hallucinations   or delusions  Her affect is normal  RESP: No cough, no audible wheezing, able to talk in full sentences  Remainder of exam unable to be completed due to telephone visits            Phone call duration: 10 minutes

## 2021-03-30 ASSESSMENT — ANXIETY QUESTIONNAIRES: GAD7 TOTAL SCORE: 0

## 2021-04-10 ENCOUNTER — HOSPITAL ENCOUNTER (OUTPATIENT)
Dept: MAMMOGRAPHY | Facility: CLINIC | Age: 46
Discharge: HOME | End: 2021-04-10
Attending: NURSE PRACTITIONER | Admitting: NURSE PRACTITIONER
Payer: COMMERCIAL

## 2021-04-10 DIAGNOSIS — Z12.31 VISIT FOR SCREENING MAMMOGRAM: ICD-10-CM

## 2021-04-10 PROCEDURE — 77063 BREAST TOMOSYNTHESIS BI: CPT

## 2021-04-26 ENCOUNTER — IMMUNIZATION (OUTPATIENT)
Dept: FAMILY MEDICINE | Facility: CLINIC | Age: 46
End: 2021-04-26
Payer: COMMERCIAL

## 2021-04-26 PROCEDURE — 0011A PR COVID VAC MODERNA 100 MCG/0.5 ML IM: CPT

## 2021-04-26 PROCEDURE — 91301 PR COVID VAC MODERNA 100 MCG/0.5 ML IM: CPT

## 2021-05-24 ENCOUNTER — IMMUNIZATION (OUTPATIENT)
Dept: FAMILY MEDICINE | Facility: CLINIC | Age: 46
End: 2021-05-24
Attending: FAMILY MEDICINE
Payer: COMMERCIAL

## 2021-05-24 PROCEDURE — 91301 PR COVID VAC MODERNA 100 MCG/0.5 ML IM: CPT

## 2021-05-24 PROCEDURE — 0012A PR COVID VAC MODERNA 100 MCG/0.5 ML IM: CPT

## 2021-06-29 ENCOUNTER — ANCILLARY PROCEDURE (OUTPATIENT)
Dept: GENERAL RADIOLOGY | Facility: CLINIC | Age: 46
End: 2021-06-29
Attending: NURSE PRACTITIONER
Payer: COMMERCIAL

## 2021-06-29 ENCOUNTER — OFFICE VISIT (OUTPATIENT)
Dept: FAMILY MEDICINE | Facility: CLINIC | Age: 46
End: 2021-06-29
Payer: COMMERCIAL

## 2021-06-29 VITALS
BODY MASS INDEX: 31.9 KG/M2 | RESPIRATION RATE: 16 BRPM | HEART RATE: 83 BPM | TEMPERATURE: 98.9 F | SYSTOLIC BLOOD PRESSURE: 130 MMHG | WEIGHT: 190.2 LBS | DIASTOLIC BLOOD PRESSURE: 82 MMHG | OXYGEN SATURATION: 97 %

## 2021-06-29 DIAGNOSIS — G89.29 CHRONIC RIGHT-SIDED THORACIC BACK PAIN: ICD-10-CM

## 2021-06-29 DIAGNOSIS — Z72.0 TOBACCO USE: ICD-10-CM

## 2021-06-29 DIAGNOSIS — M79.662 BILATERAL CALF PAIN: ICD-10-CM

## 2021-06-29 DIAGNOSIS — G47.00 INSOMNIA, UNSPECIFIED TYPE: ICD-10-CM

## 2021-06-29 DIAGNOSIS — G89.29 CHRONIC RIGHT-SIDED THORACIC BACK PAIN: Primary | ICD-10-CM

## 2021-06-29 DIAGNOSIS — M79.661 BILATERAL CALF PAIN: ICD-10-CM

## 2021-06-29 DIAGNOSIS — M54.6 CHRONIC RIGHT-SIDED THORACIC BACK PAIN: ICD-10-CM

## 2021-06-29 DIAGNOSIS — M54.6 CHRONIC RIGHT-SIDED THORACIC BACK PAIN: Primary | ICD-10-CM

## 2021-06-29 PROCEDURE — 71046 X-RAY EXAM CHEST 2 VIEWS: CPT | Performed by: RADIOLOGY

## 2021-06-29 PROCEDURE — 99214 OFFICE O/P EST MOD 30 MIN: CPT | Performed by: NURSE PRACTITIONER

## 2021-06-29 RX ORDER — HYDROXYZINE HYDROCHLORIDE 10 MG/1
10 TABLET, FILM COATED ORAL SEE ADMIN INSTRUCTIONS
Qty: 30 TABLET | Refills: 1 | Status: SHIPPED | OUTPATIENT
Start: 2021-06-29 | End: 2021-07-08

## 2021-06-29 NOTE — PATIENT INSTRUCTIONS
Thank you for choosing [facility].  You may be receiving an email and/or telephone survey request from [facility] Customer Experience regarding your visit today.  Please take a few minutes to respond to the survey to let us know how we are doing.      If you have questions or concerns, please contact us via [employer] or you can contact your care team at [phone] option [phone].    Our Clinic hours are:  Monday - Thursday 7am-6pm  Friday 7am-5pm    The [place] outpatient lab hours are:  Monday - Friday 7am-4:30pm    Appointments are required, call [phone]    If you have clinical questions after hours or would like to schedule an appointment,  call the clinic at [phone].

## 2021-06-29 NOTE — PROGRESS NOTES
"    Assessment & Plan     Chronic right-sided thoracic back pain  Patient is vague in describing symptoms   She is worried about her lungs because she is a smoker therefore will order chest x-ray- r/u cancerous etiology  Symptoms are likely related to ? MSK   - XR Chest 2 Views; Future    Tobacco use  Encouraged patient to quit   - XR Chest 2 Views; Future    Insomnia, unspecified type  Discussed sleep hygiene habits.   ? Anxiety plays role in insomnia   - hydrOXYzine (ATARAX) 10 MG tablet; Take 1 tablet (10 mg) by mouth See Admin Instructions Take 1/2- 1 tablet 30 minutes before bedtime    Bilateral calf pain  - no concerns for DVT- no swelling or tenderness  Likely MSK due to walking/jogging  Encouraged stretching / ice prn    Tobacco Cessation:   reports that she has been smoking cigarettes. She has a 20.00 pack-year smoking history. She has never used smokeless tobacco.      BMI:   Estimated body mass index is 31.9 kg/m  as calculated from the following:    Height as of 11/23/20: 1.645 m (5' 4.75\").    Weight as of this encounter: 86.3 kg (190 lb 3.2 oz).   Weight management plan: Discussed healthy diet and exercise guidelines        No follow-ups on file.    BRANNON Cerda CNP  [facility]US[facility]NAH Black is a 46 year old who presents for the following health issues   HPI     Insomnia  Onset/Duration: \"long time\"  Description:   Frequency of insomnia:  nightly  Time to fall asleep (sleep latency): patient gets in bed at 8:30-9:00 - takes  1 hour to fall asleep- typically sleeps for 1 hour then wakes up to use the bathroom, she then will eat and then will smoke and then go back to bed. Takes 30 minutes to fall back asleep then is able to sleep until 5 AM.   Middle of night awakening:  YES  Early morning awakening:  no  Progression of Symptoms:  same  Accompanying Signs & Symptoms:  Daytime sleepiness/napping: no  Excessive snoring/apnea: no  Restless legs: YES and calf " "pain  Waking to urinate: YES  Chronic pain:  no  Depression symptoms (if yes, do PHQ9): takes zoloft  Anxiety symptoms (if yes, do BRENDEN-7): YES  History:  Prior Insomnia: no  New stressful situation: work  Precipitating factors:   Caffeine intake: no  OTC decongestants: no  Any new medications: no  Alleviating factors:  Self medicating (alcohol, etc.):  no  Stress-reduction (exercise, yoga, meditation etc): none  Therapies tried and outcome: Tylenol PM and melatonin      Bilateral calf pain intermittently- patient feels that she bruises easily on her legs if she scratches.  Feels a \" pop\" at times in her calf area. No injury, no swelling. Started exercising 3-4 times a week jogging/walking 6 months ago.   Notices pain in calf at times with walking up and down stairs.     Right shoulder area- discomfort-  for the past couple of months. No shortness of breath, no cough. Notes more often when first wakes up/ gradually improves during the day.   Patient vague in describing symptoms. She is worried about her lungs because she is a smoker.     Tobacco use: history of smoking 20 yrs.     Review of Systems   Constitutional, HEENT, cardiovascular, pulmonary, GI, , musculoskeletal, neuro, skin, endocrine and psych systems are negative, except as otherwise noted.      Objective    There were no vitals taken for this visit.  There is no height or weight on file to calculate BMI.  Physical Exam   GENERAL: healthy, alert and no distress  RESP: lungs clear to auscultation - no rales, rhonchi or wheezes  CV: regular rate and rhythm, normal S1 S2, no S3 or S4, no murmur, click or rub, no peripheral edema and peripheral pulses strong  ABDOMEN: soft, nontender, no hepatosplenomegaly, no masses and bowel sounds normal  MS: no gross musculoskeletal defects noted, no edema                "

## 2021-07-07 ENCOUNTER — MYC MEDICAL ADVICE (OUTPATIENT)
Dept: FAMILY MEDICINE | Facility: CLINIC | Age: 46
End: 2021-07-07

## 2021-07-07 DIAGNOSIS — G47.00 INSOMNIA, UNSPECIFIED TYPE: ICD-10-CM

## 2021-07-08 RX ORDER — HYDROXYZINE HYDROCHLORIDE 10 MG/1
TABLET, FILM COATED ORAL
Qty: 60 TABLET | Refills: 3 | Status: SHIPPED | OUTPATIENT
Start: 2021-07-08 | End: 2022-04-05

## 2021-07-08 NOTE — TELEPHONE ENCOUNTER
Tamera,    Patient sends my chart asking if she can increase her hydroxyzine and get a greater Qty refilled then. Monica SCHMITZ RN

## 2021-09-12 ENCOUNTER — HEALTH MAINTENANCE LETTER (OUTPATIENT)
Age: 46
End: 2021-09-12

## 2022-01-02 ENCOUNTER — HEALTH MAINTENANCE LETTER (OUTPATIENT)
Age: 47
End: 2022-01-02

## 2022-04-05 ENCOUNTER — OFFICE VISIT (OUTPATIENT)
Dept: FAMILY MEDICINE | Facility: CLINIC | Age: 47
End: 2022-04-05
Payer: COMMERCIAL

## 2022-04-05 VITALS
RESPIRATION RATE: 20 BRPM | TEMPERATURE: 98.6 F | WEIGHT: 181 LBS | BODY MASS INDEX: 30.16 KG/M2 | SYSTOLIC BLOOD PRESSURE: 120 MMHG | HEIGHT: 65 IN | OXYGEN SATURATION: 96 % | DIASTOLIC BLOOD PRESSURE: 86 MMHG | HEART RATE: 92 BPM

## 2022-04-05 DIAGNOSIS — B35.1 FUNGAL TOENAIL INFECTION: ICD-10-CM

## 2022-04-05 DIAGNOSIS — M06.9 RHEUMATOID ARTHRITIS OF WRIST, UNSPECIFIED LATERALITY, UNSPECIFIED WHETHER RHEUMATOID FACTOR PRESENT (H): ICD-10-CM

## 2022-04-05 DIAGNOSIS — L30.9 DERMATITIS: ICD-10-CM

## 2022-04-05 DIAGNOSIS — Z12.31 VISIT FOR SCREENING MAMMOGRAM: ICD-10-CM

## 2022-04-05 DIAGNOSIS — F32.5 MAJOR DEPRESSION IN COMPLETE REMISSION (H): Primary | ICD-10-CM

## 2022-04-05 DIAGNOSIS — Z12.11 SCREEN FOR COLON CANCER: ICD-10-CM

## 2022-04-05 DIAGNOSIS — Z13.220 SCREENING FOR HYPERLIPIDEMIA: ICD-10-CM

## 2022-04-05 PROCEDURE — 99214 OFFICE O/P EST MOD 30 MIN: CPT | Performed by: FAMILY MEDICINE

## 2022-04-05 RX ORDER — TRIAMCINOLONE ACETONIDE 1 MG/G
OINTMENT TOPICAL 2 TIMES DAILY
Qty: 45 G | Refills: 1 | Status: SHIPPED | OUTPATIENT
Start: 2022-04-05 | End: 2022-04-19

## 2022-04-05 RX ORDER — TERBINAFINE HYDROCHLORIDE 250 MG/1
250 TABLET ORAL DAILY
Qty: 90 TABLET | Refills: 0 | Status: SHIPPED | OUTPATIENT
Start: 2022-04-05 | End: 2022-07-04

## 2022-04-05 RX ORDER — SERTRALINE HYDROCHLORIDE 100 MG/1
200 TABLET, FILM COATED ORAL DAILY
Qty: 180 TABLET | Refills: 3 | Status: SHIPPED | OUTPATIENT
Start: 2022-04-05 | End: 2023-04-12

## 2022-04-05 ASSESSMENT — ANXIETY QUESTIONNAIRES
GAD7 TOTAL SCORE: 0
5. BEING SO RESTLESS THAT IT IS HARD TO SIT STILL: NOT AT ALL
7. FEELING AFRAID AS IF SOMETHING AWFUL MIGHT HAPPEN: NOT AT ALL
7. FEELING AFRAID AS IF SOMETHING AWFUL MIGHT HAPPEN: NOT AT ALL
1. FEELING NERVOUS, ANXIOUS, OR ON EDGE: NOT AT ALL
2. NOT BEING ABLE TO STOP OR CONTROL WORRYING: NOT AT ALL
GAD7 TOTAL SCORE: 0
6. BECOMING EASILY ANNOYED OR IRRITABLE: NOT AT ALL
4. TROUBLE RELAXING: NOT AT ALL
3. WORRYING TOO MUCH ABOUT DIFFERENT THINGS: NOT AT ALL
GAD7 TOTAL SCORE: 0

## 2022-04-05 ASSESSMENT — PAIN SCALES - GENERAL: PAINLEVEL: NO PAIN (0)

## 2022-04-05 ASSESSMENT — PATIENT HEALTH QUESTIONNAIRE - PHQ9
10. IF YOU CHECKED OFF ANY PROBLEMS, HOW DIFFICULT HAVE THESE PROBLEMS MADE IT FOR YOU TO DO YOUR WORK, TAKE CARE OF THINGS AT HOME, OR GET ALONG WITH OTHER PEOPLE: NOT DIFFICULT AT ALL
SUM OF ALL RESPONSES TO PHQ QUESTIONS 1-9: 2
SUM OF ALL RESPONSES TO PHQ QUESTIONS 1-9: 2

## 2022-04-05 NOTE — PROGRESS NOTES
Assessment & Plan     Major depression in complete remission (H)  Medication faxed  - sertraline (ZOLOFT) 100 MG tablet; Take 2 tablets (200 mg) by mouth daily    Screen for colon cancer  Patient reminded of colon cancer screening  - Adult Gastro Ref - Procedure Only; Future    Screening for hyperlipidemia  Patient will call to schedule lab appointment  - Lipid panel reflex to direct LDL Fasting; Future    Visit for screening mammogram  - MA SCREENING DIGITAL BILAT - Future  (s+30); Future    Fungal toenail infection  - terbinafine (LAMISIL) 250 MG tablet; Take 1 tablet (250 mg) by mouth daily  - Adult Dermatology Referral; Future    Dermatitis  - triamcinolone (KENALOG) 0.1 % external ointment; Apply topically 2 times daily for 14 days         Rheumatoid arthritis of wrist, unspecified laterality, unspecified whether rheumatoid factor present (H)  Stable- no new symptoms.          FUTURE APPOINTMENTS:       - Follow-up visit in one month or sooner as needed    Return in about 4 weeks (around 5/3/2022) for Follow up.    Zac Farmer MD  [facility]    Nikole Black is a 47 year old who presents for the following health issues  accompanied by her self.    47 yr old female here for her annual physical. She has depression well controlled on sertraline. She also needs something for toe nail fungus as she has tried over the counter medication with no relief. She was reminded of colonoscopy and mammogram.    History of Present Illness       Mental Health Follow-up:                    Today's PHQ-9         PHQ-9 Total Score: 2  PHQ-9 Q9 Thoughts of better off dead/self-harm past 2 weeks :   Not at all    How difficult have these problems made it for you to do your work, take care of things at home, or get along with other people: Not difficult at all    Today's BRENDEN-7 Score: 0    Reason for visit:  Toenails and medication refill  Symptom onset:  More than a month  Symptoms include:   Fungus  Symptom intensity:  Mild  Symptom progression:  Staying the same  Had these symptoms before:  Yes  Has tried/received treatment for these symptoms:  No    She eats 2-3 servings of fruits and vegetables daily.She consumes 2 sweetened beverage(s) daily.She exercises with enough effort to increase her heart rate 9 or less minutes per day.  She exercises with enough effort to increase her heart rate 3 or less days per week.   She is taking medications regularly.     Chief Complaint   Patient presents with     Depression     Recheck on depression and anxiety.  Her PCP is no longer here.     Nail Problem     Toenail issues for years.  This is for some of the toenails on both feet.  They are thicker and look different.  She tried an OTC a couple of years ago that didn't help much.     Derm Problem     Needing to know the name of a cream that was prescribed for a rash.  This is now over the counter and can't remember the name.  Has a rash area on the chest again needing to use this for.     Health Maintenance     She is due for a mammogram.       Depression and Anxiety Follow-Up    How are you doing with your depression since your last visit? No change    How are you doing with your anxiety since your last visit?  No change    Are you having other symptoms that might be associated with depression or anxiety? No    Have you had a significant life event? No     Do you have any concerns with your use of alcohol or other drugs? No    Social History     Tobacco Use     Smoking status: Current Every Day Smoker     Packs/day: 1.00     Years: 20.00     Pack years: 20.00     Types: Cigarettes     Smokeless tobacco: Never Used   Vaping Use     Vaping Use: Never used   Substance Use Topics     Alcohol use: Yes     Comment: very rarely     Drug use: No     PHQ 2/9/2021 3/29/2021 4/5/2022   PHQ-9 Total Score 2 0 2   Q9: Thoughts of better off dead/self-harm past 2 weeks Not at all Not at all Not at all     BRENDEN-7 SCORE 2/9/2021  "3/29/2021 4/5/2022   Total Score - - 0 (minimal anxiety)   Total Score 2 0 0     Last PHQ-9 4/5/2022   1.  Little interest or pleasure in doing things 0   2.  Feeling down, depressed, or hopeless 0   3.  Trouble falling or staying asleep, or sleeping too much 1   4.  Feeling tired or having little energy 1   5.  Poor appetite or overeating 0   6.  Feeling bad about yourself 0   7.  Trouble concentrating 0   8.  Moving slowly or restless 0   Q9: Thoughts of better off dead/self-harm past 2 weeks 0   PHQ-9 Total Score 2   Difficulty at work, home, or with people -     BRENDEN-7  4/5/2022   1. Feeling nervous, anxious, or on edge 0   2. Not being able to stop or control worrying 0   3. Worrying too much about different things 0   4. Trouble relaxing 0   5. Being so restless that it is hard to sit still 0   6. Becoming easily annoyed or irritable 0   7. Feeling afraid, as if something awful might happen 0   BRENDEN-7 Total Score 0   If you checked any problems, how difficult have they made it for you to do your work, take care of things at home, or get along with other people? -       Suicide Assessment Five-step Evaluation and Treatment (SAFE-T)        Review of Systems   Constitutional, HEENT, cardiovascular, pulmonary, gi and gu systems are negative, except as otherwise noted.      Objective    /86   Pulse 92   Temp 98.6  F (37  C) (Tympanic)   Resp 20   Ht 1.651 m (5' 5\")   Wt 82.1 kg (181 lb)   SpO2 96%   BMI 30.12 kg/m    Body mass index is 30.12 kg/m .  Physical Exam   GENERAL: healthy, alert and no distress  EYES: Eyes grossly normal to inspection, PERRL and conjunctivae and sclerae normal  HENT: ear canals and TM's normal, nose and mouth without ulcers or lesions  NECK: no adenopathy, no asymmetry, masses, or scars and thyroid normal to palpation  RESP: lungs clear to auscultation - no rales, rhonchi or wheezes  CV: regular rate and rhythm, normal S1 S2, no S3 or S4, no murmur, click or rub, no peripheral " edema and peripheral pulses strong  ABDOMEN: soft, nontender, no hepatosplenomegaly, no masses and bowel sounds normal  MS: no gross musculoskeletal defects noted, no edema

## 2022-04-05 NOTE — PATIENT INSTRUCTIONS
Thank you for choosing [facility].  You may be receiving an email and/or telephone survey request from [facility] Customer Experience regarding your visit today.  Please take a few minutes to respond to the survey to let us know how we are doing.      If you have questions or concerns, please contact us via [employer] or you can contact your care team at [phone] option 2.    Our Clinic hours are:  Monday - Thursday 7am-6pm  Friday 7am-5pm    The [place] outpatient lab hours are:  Monday - Friday 7am-4:30pm    Appointments are required, call [phone]    If you have clinical questions after hours or would like to schedule an appointment,  call the clinic at [phone].

## 2022-04-06 ASSESSMENT — ANXIETY QUESTIONNAIRES: GAD7 TOTAL SCORE: 0

## 2022-04-19 ENCOUNTER — OFFICE VISIT (OUTPATIENT)
Dept: DERMATOLOGY | Facility: CLINIC | Age: 47
End: 2022-04-19
Payer: COMMERCIAL

## 2022-04-19 VITALS — DIASTOLIC BLOOD PRESSURE: 76 MMHG | SYSTOLIC BLOOD PRESSURE: 112 MMHG | HEART RATE: 82 BPM | OXYGEN SATURATION: 99 %

## 2022-04-19 DIAGNOSIS — B35.1 FUNGAL TOENAIL INFECTION: ICD-10-CM

## 2022-04-19 PROCEDURE — 99212 OFFICE O/P EST SF 10 MIN: CPT | Performed by: DERMATOLOGY

## 2022-04-19 NOTE — NURSING NOTE
Chief Complaint   Patient presents with     Derm Problem     Nail deformity         Adilia Hernandez on 4/19/2022 at 1:16 PM

## 2022-04-19 NOTE — PROGRESS NOTES
Sofia Gracia is an extremely pleasant 47 year old year old female patient here today for toenail fungus.  Given terbinafine and has been on for one week.  Patient has no other skin complaints today.  Remainder of the HPI, Meds, PMH, Allergies, FH, and SH was reviewed in chart.      Past Medical History:   Diagnosis Date     Contact dermatitis and other eczema, due to unspecified cause     hands     RA (rheumatoid arthritis) (H)     diagnosed 12-15 years ago       No past surgical history on file.     Family History   Problem Relation Age of Onset     Cancer Mother         pancreatic     C.A.D. Father         CABG, onset heart disease in late 40s or early 50s     Neuropathy Sister         MS     Cancer Paternal Aunt         liver cancer       Social History     Socioeconomic History     Marital status: Single     Spouse name: Not on file     Number of children: Not on file     Years of education: Not on file     Highest education level: Not on file   Occupational History     Not on file   Tobacco Use     Smoking status: Current Every Day Smoker     Packs/day: 1.00     Years: 20.00     Pack years: 20.00     Types: Cigarettes     Smokeless tobacco: Never Used   Vaping Use     Vaping Use: Never used   Substance and Sexual Activity     Alcohol use: Yes     Comment: very rarely     Drug use: No     Sexual activity: Yes     Partners: Male     Birth control/protection: Male Surgical     Comment: stopped taking birth control September 2015   Other Topics Concern     Parent/sibling w/ CABG, MI or angioplasty before 65F 55M? Yes     Comment: father   Social History Narrative    September 13, 2019    ENVIRONMENTAL HISTORY: The family lives in a [facility] [place]. The home is heated with a forced air and gas furnace. They do have central air conditioning. The patient's bedroom is furnished with carpeting in bedroom and fabric window coverings.  Pets inside the house include 1 cat and 1 dog. There is no history  of cockroach or mice infestation. There is 1 smoker, smokes outside/garage.  The house does not have a damp basement.      Social Determinants of Health     Financial Resource Strain: Not on file   Food Insecurity: Not on file   Transportation Needs: Not on file   Physical Activity: Not on file   Stress: Not on file   Social Connections: Not on file   Intimate Partner Violence: Not on file   Housing Stability: Not on file       Outpatient Encounter Medications as of 4/19/2022   Medication Sig Dispense Refill     cetirizine (ZYRTEC) 10 MG tablet Take 10 mg by mouth daily (Patient not taking: Reported on 4/5/2022)       EPINEPHrine (EPIPEN/ADRENACLICK/OR ANY BX GENERIC EQUIV) 0.3 MG/0.3ML injection 2-pack Inject 0.3 mLs (0.3 mg) into the muscle as needed for anaphylaxis (Patient not taking: Reported on 3/29/2021) 1 each 3     sertraline (ZOLOFT) 100 MG tablet Take 2 tablets (200 mg) by mouth daily 180 tablet 3     terbinafine (LAMISIL) 250 MG tablet Take 1 tablet (250 mg) by mouth daily 90 tablet 0     triamcinolone (KENALOG) 0.1 % external ointment Apply topically 2 times daily for 14 days 45 g 1     No facility-administered encounter medications on file as of 4/19/2022.             O:   NAD, WDWN, Alert & Oriented, Mood & Affect wnl, Vitals stable   Here today alone   General appearance normal   Vitals stable   Alert, oriented and in no acute distress     White and flaking of toenails       Eyes: Conjunctivae/lids:Normal     ENT: Lips, buccal mucosa, tongue: normal    MSK:Normal    Cardiovascular: peripheral edema none    Pulm: Breathing Normal    Neuro/Psych: Orientation:Alert and Orientedx3 ; Mood/Affect:normal       A/P:  1. Onychomycosis   Pathophysiology discussed with pateint   Topicals and orals discussed with patient   Cont oral terbinafine  Check lfts in 3 months  Return to clinic 3 months  It was a pleasure speaking to Sofia Garcia today.

## 2022-04-19 NOTE — LETTER
4/19/2022         RE: Sofia Garcia  [address] [facility] [address]        Dear Colleague,    Thank you for referring your patient, Sofia Garcia, to the [facility]. Please see a copy of my visit note below.    Sofia Garcia is an extremely pleasant 47 year old year old female patient here today for toenail fungus.  Given terbinafine and has been on for one week.  Patient has no other skin complaints today.  Remainder of the HPI, Meds, PMH, Allergies, FH, and SH was reviewed in chart.      Past Medical History:   Diagnosis Date     Contact dermatitis and other eczema, due to unspecified cause     hands     RA (rheumatoid arthritis) (H)     diagnosed 12-15 years ago       No past surgical history on file.     Family History   Problem Relation Age of Onset     Cancer Mother         pancreatic     C.A.D. Father         CABG, onset heart disease in late 40s or early 50s     Neuropathy Sister         MS     Cancer Paternal Aunt         liver cancer       Social History     Socioeconomic History     Marital status: Single     Spouse name: Not on file     Number of children: Not on file     Years of education: Not on file     Highest education level: Not on file   Occupational History     Not on file   Tobacco Use     Smoking status: Current Every Day Smoker     Packs/day: 1.00     Years: 20.00     Pack years: 20.00     Types: Cigarettes     Smokeless tobacco: Never Used   Vaping Use     Vaping Use: Never used   Substance and Sexual Activity     Alcohol use: Yes     Comment: very rarely     Drug use: No     Sexual activity: Yes     Partners: Male     Birth control/protection: Male Surgical     Comment: stopped taking birth control September 2015   Other Topics Concern     Parent/sibling w/ CABG, MI or angioplasty before 65F 55M? Yes     Comment: father   Social History Narrative    September 13, 2019    ENVIRONMENTAL HISTORY: The family lives in a [facility] home [place]  [place]. The home is heated with a forced air and gas furnace. They do have central air conditioning. The patient's bedroom is furnished with carpeting in bedroom and fabric window coverings.  Pets inside the house include 1 cat and 1 dog. There is no history of cockroach or mice infestation. There is 1 smoker, smokes outside/garage.  The house does not have a damp basement.      Social Determinants of Health     Financial Resource Strain: Not on file   Food Insecurity: Not on file   Transportation Needs: Not on file   Physical Activity: Not on file   Stress: Not on file   Social Connections: Not on file   Intimate Partner Violence: Not on file   Housing Stability: Not on file       Outpatient Encounter Medications as of 4/19/2022   Medication Sig Dispense Refill     cetirizine (ZYRTEC) 10 MG tablet Take 10 mg by mouth daily (Patient not taking: Reported on 4/5/2022)       EPINEPHrine (EPIPEN/ADRENACLICK/OR ANY BX GENERIC EQUIV) 0.3 MG/0.3ML injection 2-pack Inject 0.3 mLs (0.3 mg) into the muscle as needed for anaphylaxis (Patient not taking: Reported on 3/29/2021) 1 each 3     sertraline (ZOLOFT) 100 MG tablet Take 2 tablets (200 mg) by mouth daily 180 tablet 3     terbinafine (LAMISIL) 250 MG tablet Take 1 tablet (250 mg) by mouth daily 90 tablet 0     triamcinolone (KENALOG) 0.1 % external ointment Apply topically 2 times daily for 14 days 45 g 1     No facility-administered encounter medications on file as of 4/19/2022.             O:   NAD, WDWN, Alert & Oriented, Mood & Affect wnl, Vitals stable   Here today alone   General appearance normal   Vitals stable   Alert, oriented and in no acute distress     White and flaking of toenails       Eyes: Conjunctivae/lids:Normal     ENT: Lips, buccal mucosa, tongue: normal    MSK:Normal    Cardiovascular: peripheral edema none    Pulm: Breathing Normal    Neuro/Psych: Orientation:Alert and Orientedx3 ; Mood/Affect:normal       A/P:  1. Onychomycosis   Pathophysiology  discussed with pateint   Topicals and orals discussed with patient   Cont oral terbinafine  Check lfts in 3 months  Return to clinic 3 months  It was a pleasure speaking to Sofia Garcia today.        Again, thank you for allowing me to participate in the care of your patient.        Sincerely,        Dylan Boogie MD

## 2022-04-25 ENCOUNTER — LAB (OUTPATIENT)
Dept: LAB | Facility: CLINIC | Age: 47
End: 2022-04-25
Payer: COMMERCIAL

## 2022-04-25 DIAGNOSIS — Z13.220 SCREENING FOR HYPERLIPIDEMIA: ICD-10-CM

## 2022-04-25 DIAGNOSIS — Z11.4 SCREENING FOR HIV (HUMAN IMMUNODEFICIENCY VIRUS): ICD-10-CM

## 2022-04-25 DIAGNOSIS — Z11.59 NEED FOR HEPATITIS C SCREENING TEST: ICD-10-CM

## 2022-04-25 LAB
CHOLEST SERPL-MCNC: 171 MG/DL
FASTING STATUS PATIENT QL REPORTED: YES
HCV AB SERPL QL IA: NONREACTIVE
HDLC SERPL-MCNC: 55 MG/DL
HIV 1+2 AB+HIV1 P24 AG SERPL QL IA: NONREACTIVE
LDLC SERPL CALC-MCNC: 103 MG/DL
NONHDLC SERPL-MCNC: 116 MG/DL
TRIGL SERPL-MCNC: 64 MG/DL

## 2022-04-25 PROCEDURE — 87389 HIV-1 AG W/HIV-1&-2 AB AG IA: CPT

## 2022-04-25 PROCEDURE — 80061 LIPID PANEL: CPT

## 2022-04-25 PROCEDURE — 36415 COLL VENOUS BLD VENIPUNCTURE: CPT

## 2022-04-25 PROCEDURE — 86803 HEPATITIS C AB TEST: CPT

## 2022-04-25 NOTE — LETTER
May 3, 2022      Sofia Garcia  [address] [facility] [address]        Dear ,    We are writing to inform you of your test results.    Please inform patient that test result was within normal parameters.Cholesterol labs were slightly high.   Recommend lifestyle changes.     Thank you.     Zac Farmer M.D.       Resulted Orders   Hepatitis C Screen Reflex to HCV RNA Quant and Genotype   Result Value Ref Range    Hepatitis C Antibody Nonreactive Nonreactive    Narrative    Assay performance characteristics have not been established for newborns, infants, and children.   HIV Antigen Antibody Combo   Result Value Ref Range    HIV Antigen Antibody Combo Nonreactive Nonreactive      Comment:      HIV-1 p24 Ag & HIV-1/HIV-2 Ab Not Detected   Lipid panel reflex to direct LDL Fasting   Result Value Ref Range    Cholesterol 171 <200 mg/dL    Triglycerides 64 <150 mg/dL    Direct Measure HDL 55 >=50 mg/dL    LDL Cholesterol Calculated 103 (H) <=100 mg/dL    Non HDL Cholesterol 116 <130 mg/dL    Patient Fasting > 8hrs? Yes     Narrative    Cholesterol  Desirable:  <200 mg/dL    Triglycerides  Normal:  Less than 150 mg/dL  Borderline High:  150-199 mg/dL  High:  200-499 mg/dL  Very High:  Greater than or equal to 500 mg/dL    Direct Measure HDL  Female:  Greater than or equal to 50 mg/dL   Male:  Greater than or equal to 40 mg/dL    LDL Cholesterol  Desirable:  <100mg/dL  Above Desirable:  100-129 mg/dL   Borderline High:  130-159 mg/dL   High:  160-189 mg/dL   Very High:  >= 190 mg/dL    Non HDL Cholesterol  Desirable:  130 mg/dL  Above Desirable:  130-159 mg/dL  Borderline High:  160-189 mg/dL  High:  190-219 mg/dL  Very High:  Greater than or equal to 220 mg/dL       If you have any questions or concerns, please call the clinic at the number listed above.       Sincerely,      Zac Farmer MD

## 2022-05-03 NOTE — RESULT ENCOUNTER NOTE
Please inform patient that test result was within normal parameters.Cholesterol labs were slightly high.  Recommend lifestyle changes.    Thank you.     Zac Farmer M.D.

## 2022-05-31 ENCOUNTER — HOSPITAL ENCOUNTER (OUTPATIENT)
Dept: MAMMOGRAPHY | Facility: CLINIC | Age: 47
Discharge: HOME | End: 2022-05-31
Attending: FAMILY MEDICINE | Admitting: FAMILY MEDICINE
Payer: COMMERCIAL

## 2022-05-31 DIAGNOSIS — Z12.31 VISIT FOR SCREENING MAMMOGRAM: ICD-10-CM

## 2022-05-31 PROCEDURE — 77067 SCR MAMMO BI INCL CAD: CPT

## 2022-07-08 ENCOUNTER — LAB (OUTPATIENT)
Dept: LAB | Facility: CLINIC | Age: 47
End: 2022-07-08
Payer: COMMERCIAL

## 2022-07-08 DIAGNOSIS — B35.1 FUNGAL TOENAIL INFECTION: ICD-10-CM

## 2022-07-08 LAB
ALBUMIN SERPL-MCNC: 3.7 G/DL (ref 3.4–5)
ALP SERPL-CCNC: 84 U/L (ref 40–150)
ALT SERPL W P-5'-P-CCNC: 28 U/L (ref 0–50)
AST SERPL W P-5'-P-CCNC: 20 U/L (ref 0–45)
BILIRUB DIRECT SERPL-MCNC: <0.1 MG/DL (ref 0–0.2)
BILIRUB SERPL-MCNC: 0.3 MG/DL (ref 0.2–1.3)
PROT SERPL-MCNC: 7.5 G/DL (ref 6.8–8.8)

## 2022-07-08 PROCEDURE — 36415 COLL VENOUS BLD VENIPUNCTURE: CPT

## 2022-07-08 PROCEDURE — 80076 HEPATIC FUNCTION PANEL: CPT

## 2022-11-19 ENCOUNTER — HEALTH MAINTENANCE LETTER (OUTPATIENT)
Age: 47
End: 2022-11-19

## 2023-04-09 ENCOUNTER — HEALTH MAINTENANCE LETTER (OUTPATIENT)
Age: 48
End: 2023-04-09

## 2023-04-20 ENCOUNTER — TELEPHONE (OUTPATIENT)
Dept: FAMILY MEDICINE | Facility: CLINIC | Age: 48
End: 2023-04-20
Payer: COMMERCIAL

## 2023-04-20 NOTE — LETTER
April 20, 2023    Sofia Garcia  [address] [facility] [address]    Your team at [facility] cares about your health. We have reviewed your chart and based on our findings; we are making the following recommendations to better manage your health.     You are in particular need of attention regarding the following:     Schedule Annual MAMMOGRAPHY. [facility] scheduling number is [phone] or schedule in [facility] (self referral).  Call or MyChart message your clinic to schedule a colonoscopy, schedule/ a FIT Test, or order a Cologuard test. If you are unsure what type of test you need, please call your clinic and speak to clinic staff.   Colon cancer is now the second leading cause of cancer-related deaths in the [place] for both men and women and there are over 130,000 new cases and 50,000 deaths per year from colon cancer. Colonoscopies can prevent 90-95% of these deaths. Problem lesions can be removed before they ever become cancer. This test is not only looking for cancer, but also getting rid of precancerous lesions.   If you are under/uninsured, we recommend you contact the [employer] Program.[employer] is a free colorectal cancer screening program that provides colonoscopies for eligible under/uninsured [place] men and women. If you are interested in receiving a free colonoscopy, please call [facility] at t [phone] (mention code [employer]) to see if you're eligible. Please have them send us the results.   PREVENTATIVE VISIT: Physical    If you have already completed these items, please contact the clinic via phone or   [facility] [employer] so your care team can review and update your records. Thank you for   choosing [facility] for your healthcare needs. For any questions,   concerns, or to schedule an appointment please contact our clinic.    Healthy Regards,      Your [facility] Care Team

## 2023-04-20 NOTE — TELEPHONE ENCOUNTER
Patient Quality Outreach    Patient is due for the following:   Colon Cancer Screening  Breast Cancer Screening - Mammogram  Physical Preventive Adult Physical    Next Steps:   Schedule a Adult Preventative  No follow up needed at this time.    Type of outreach:    Sent letter.    Next Steps:  Reach out within 90 days via Letter.    Max number of attempts reached: Yes. Will try again in 90 days if patient still on fail list.    Questions for provider review:    None     Erika Hernandez MA       Ipledge Number (Optional): [id number] Ipledge Number (Optional): [id number]

## 2023-04-26 ENCOUNTER — OFFICE VISIT (OUTPATIENT)
Dept: FAMILY MEDICINE | Facility: CLINIC | Age: 48
End: 2023-04-26
Payer: COMMERCIAL

## 2023-04-26 VITALS
TEMPERATURE: 97.5 F | DIASTOLIC BLOOD PRESSURE: 84 MMHG | WEIGHT: 175 LBS | SYSTOLIC BLOOD PRESSURE: 114 MMHG | BODY MASS INDEX: 29.16 KG/M2 | HEART RATE: 82 BPM | HEIGHT: 65 IN | RESPIRATION RATE: 16 BRPM | OXYGEN SATURATION: 98 %

## 2023-04-26 DIAGNOSIS — G47.00 INSOMNIA, UNSPECIFIED TYPE: ICD-10-CM

## 2023-04-26 DIAGNOSIS — M06.9 RHEUMATOID ARTHRITIS OF WRIST, UNSPECIFIED LATERALITY, UNSPECIFIED WHETHER RHEUMATOID FACTOR PRESENT (H): ICD-10-CM

## 2023-04-26 DIAGNOSIS — F33.1 MODERATE EPISODE OF RECURRENT MAJOR DEPRESSIVE DISORDER (H): ICD-10-CM

## 2023-04-26 DIAGNOSIS — Z23 HIGH PRIORITY FOR 2019-NCOV VACCINE: ICD-10-CM

## 2023-04-26 DIAGNOSIS — Z12.11 SCREEN FOR COLON CANCER: ICD-10-CM

## 2023-04-26 DIAGNOSIS — F33.42 RECURRENT MAJOR DEPRESSIVE DISORDER, IN FULL REMISSION (H): ICD-10-CM

## 2023-04-26 DIAGNOSIS — Z00.00 ROUTINE GENERAL MEDICAL EXAMINATION AT A HEALTH CARE FACILITY: Primary | ICD-10-CM

## 2023-04-26 DIAGNOSIS — Z23 ENCOUNTER FOR IMMUNIZATION: ICD-10-CM

## 2023-04-26 PROCEDURE — 99214 OFFICE O/P EST MOD 30 MIN: CPT | Mod: 25 | Performed by: FAMILY MEDICINE

## 2023-04-26 PROCEDURE — 90677 PCV20 VACCINE IM: CPT | Performed by: FAMILY MEDICINE

## 2023-04-26 PROCEDURE — 99396 PREV VISIT EST AGE 40-64: CPT | Mod: 25 | Performed by: FAMILY MEDICINE

## 2023-04-26 PROCEDURE — 90471 IMMUNIZATION ADMIN: CPT | Performed by: FAMILY MEDICINE

## 2023-04-26 PROCEDURE — 91313 COVID-19 VACCINE BIVALENT BOOSTER 18+ (MODERNA): CPT | Performed by: FAMILY MEDICINE

## 2023-04-26 PROCEDURE — 0134A COVID-19 VACCINE BIVALENT BOOSTER 18+ (MODERNA): CPT | Performed by: FAMILY MEDICINE

## 2023-04-26 RX ORDER — SERTRALINE HYDROCHLORIDE 100 MG/1
200 TABLET, FILM COATED ORAL DAILY
Qty: 180 TABLET | Refills: 3 | Status: SHIPPED | OUTPATIENT
Start: 2023-04-26 | End: 2024-04-23

## 2023-04-26 ASSESSMENT — ENCOUNTER SYMPTOMS
NAUSEA: 0
JOINT SWELLING: 0
ABDOMINAL PAIN: 0
PARESTHESIAS: 0
CONSTIPATION: 0
HEADACHES: 0
NERVOUS/ANXIOUS: 0
HEMATOCHEZIA: 0
ARTHRALGIAS: 0
DYSURIA: 0
MYALGIAS: 0
SHORTNESS OF BREATH: 0
COUGH: 0
FEVER: 0
SORE THROAT: 0
BREAST MASS: 0
EYE PAIN: 0
DIZZINESS: 0
WEAKNESS: 0
CHILLS: 0
FREQUENCY: 0
DIARRHEA: 0
HEARTBURN: 0
PALPITATIONS: 0
HEMATURIA: 0

## 2023-04-26 ASSESSMENT — PATIENT HEALTH QUESTIONNAIRE - PHQ9
SUM OF ALL RESPONSES TO PHQ QUESTIONS 1-9: 3
10. IF YOU CHECKED OFF ANY PROBLEMS, HOW DIFFICULT HAVE THESE PROBLEMS MADE IT FOR YOU TO DO YOUR WORK, TAKE CARE OF THINGS AT HOME, OR GET ALONG WITH OTHER PEOPLE: NOT DIFFICULT AT ALL
SUM OF ALL RESPONSES TO PHQ QUESTIONS 1-9: 3

## 2023-04-26 ASSESSMENT — PAIN SCALES - GENERAL: PAINLEVEL: NO PAIN (0)

## 2023-04-26 NOTE — PROGRESS NOTES
SUBJECTIVE:   CC: Sofia is an 48 year old who presents for preventive health visit.     Patient is a 48-year-old female presenting to the clinic for her annual physical.  She has a past medical history significant for depression and anxiety.      Anxiety /Depression  She reports lately her anxiety has been somewhat worse with some issues with her stepdaughter.  She is requesting a therapy referral was placed for her.  She appears to be tolerating her sertraline and has no issues taking that.      Preventive measures  Colonoscopy due  Mammogram due.   Pap smear due in 2025  Needs Prevnar 20 and COVID booster.     Insomnia/Restless leg syndrome  She also mentioned that she has been having a hard time sleeping mostly falling asleep is the main issue.  She said she may also have some mild restless leg syndrome but not open to medications yet.      Ear symptoms  Lastly she also wanted ears checked out she said a few weeks ago she was cleaning out ears and had some blood from her right ear.  Denies any hearing loss or drainage.            4/26/2023     7:28 AM   Additional Questions   Roomed by Mireya Mayberry CMA   {Split Bill scripting  The purpose of this visit is to discuss your medical history and prevent health problems before you are sick. You may be responsible for a co-pay, coinsurance, or deductible if your visit today includes services such as checking on a sore throat, having an x-ray or lab test, or treating and evaluating a new or existing condition  Patient has been advised of split billing requirements and indicates understanding: Yes  Healthy Habits:     Getting at least 3 servings of Calcium per day:  NO    Bi-annual eye exam:  Yes    Dental care twice a year:  NO    Sleep apnea or symptoms of sleep apnea:  Daytime drowsiness    Diet:  Regular (no restrictions)    Frequency of exercise:  None    Taking medications regularly:  Yes    Medication side effects:  None    PHQ-2 Total Score: 0     Additional concerns today:  No  Anxiety  Pertinent negatives include no abdominal pain, arthralgias, chest pain, chills, congestion, coughing, fever, headaches, joint swelling, myalgias, nausea, rash, sore throat or weakness.     Chief Complaint   Patient presents with     Physical     General physical exam.     Anxiety     Refill of medication.       Blood Draw     She is not fasting today.  She had an espresso drink.     IUD     She thought she heard the IUD is now good for 7 years instead of 5 years.  She did have some spotting start this morning.       Anxiety Follow-Up    How are you doing with your anxiety since your last visit? Feels she may have more anxiety as she has more going on with her work in the last 3 years.  Teenager issues.  She feels she can manage with being on the medication.    Are you having other symptoms that might be associated with anxiety? Yes:  Can feel down at times.    Have you had a significant life event? See above.     Are you feeling depressed? Can feel down at times.  Is able to manage.    Do you have any concerns with your use of alcohol or other drugs? No    Social History     Tobacco Use     Smoking status: Every Day     Packs/day: 1.00     Years: 20.00     Pack years: 20.00     Types: Cigarettes     Smokeless tobacco: Never   Vaping Use     Vaping status: Never Used   Substance Use Topics     Alcohol use: Yes     Comment: very rarely     Drug use: No         2/9/2021     7:43 AM 3/29/2021     7:21 AM 4/5/2022     6:49 AM   BRENDEN-7 SCORE   Total Score   0 (minimal anxiety)   Total Score 2 0 0         3/29/2021     7:21 AM 4/5/2022     6:48 AM 4/26/2023     7:21 AM   PHQ   PHQ-9 Total Score 0 2 3   Q9: Thoughts of better off dead/self-harm past 2 weeks Not at all Not at all Not at all         4/26/2023     7:21 AM   Last PHQ-9   1.  Little interest or pleasure in doing things 0   2.  Feeling down, depressed, or hopeless 0   3.  Trouble falling or staying asleep, or sleeping too  much 1   4.  Feeling tired or having little energy 1   5.  Poor appetite or overeating 0   6.  Feeling bad about yourself 1   7.  Trouble concentrating 0   8.  Moving slowly or restless 0   Q9: Thoughts of better off dead/self-harm past 2 weeks 0   PHQ-9 Total Score 3         4/5/2022     6:49 AM   BRENDEN-7    1. Feeling nervous, anxious, or on edge 0   2. Not being able to stop or control worrying 0   3. Worrying too much about different things 0   4. Trouble relaxing 0   5. Being so restless that it is hard to sit still 0   6. Becoming easily annoyed or irritable 0   7. Feeling afraid, as if something awful might happen 0   BRENDEN-7 Total Score 0         Today's PHQ-2 Score:       4/26/2023     7:24 AM   PHQ-2 ( 1999 Pf[employer])   Q1: Little interest or pleasure in doing things 0   Q2: Feeling down, depressed or hopeless 0   PHQ-2 Score 0   Q1: Little interest or pleasure in doing things Not at all   Q2: Feeling down, depressed or hopeless Not at all   PHQ-2 Score 0       Have you ever done Advance Care Planning? (For example, a Health Directive, POLST, or a discussion with a medical provider or your loved ones about your wishes): No, advance care planning information given to patient to review.  Patient plans to discuss their wishes with loved ones or provider.      Social History     Tobacco Use     Smoking status: Every Day     Packs/day: 1.00     Years: 20.00     Pack years: 20.00     Types: Cigarettes     Smokeless tobacco: Never   Vaping Use     Vaping status: Never Used   Substance Use Topics     Alcohol use: Yes     Comment: very rarely         4/26/2023     7:24 AM   Alcohol Use   Prescreen: >3 drinks/day or >7 drinks/week? Not Applicable     Reviewed orders with patient.  Reviewed health maintenance and updated orders accordingly - Yes  Labs reviewed in EPIC  BP Readings from Last 3 Encounters:   04/26/23 114/84   04/19/22 112/76   04/05/22 120/86    Wt Readings from Last 3 Encounters:   04/26/23 79.4 kg (175 lb)    22 82.1 kg (181 lb)   21 86.3 kg (190 lb 3.2 oz)                  Patient Active Problem List   Diagnosis     Tobacco use disorder     Arthropathy     Major depression in complete remission (H)     Excessive or frequent menstruation     Encounter for insertion of mirena IUD     RA (rheumatoid arthritis) (H)     Hypovitaminosis D     Anxiety state     Recurrent major depressive disorder, in full remission (H)     History reviewed. No pertinent surgical history.    Social History     Tobacco Use     Smoking status: Every Day     Packs/day: 1.00     Years: 20.00     Pack years: 20.00     Types: Cigarettes     Smokeless tobacco: Never   Vaping Use     Vaping status: Never Used   Substance Use Topics     Alcohol use: Yes     Comment: very rarely     Family History   Problem Relation Age of Onset     Cancer Mother         pancreatic     C.A.D. Father         CABG, onset heart disease in late 40s or early 50s     Neuropathy Sister         MS     Cancer Paternal Aunt         liver cancer         Current Outpatient Medications   Medication Sig Dispense Refill     cetirizine (ZYRTEC) 10 MG tablet Take 10 mg by mouth daily       sertraline (ZOLOFT) 100 MG tablet Take 2 tablets (200 mg) by mouth daily 180 tablet 3     No Known Allergies    Breast Cancer Screenin/26/2023     7:24 AM   Breast CA Risk Assessment (FHS-7)   Do you have a family history of breast, colon, or ovarian cancer? No / Unknown         Mammogram Screening: Recommended annual mammography  Pertinent mammograms are reviewed under the imaging tab.    History of abnormal Pap smear: NO - age 30-65 PAP every 5 years with negative HPV co-testing recommended      Latest Ref Rng & Units 2020     7:21 AM 2020     7:10 AM 10/20/2017     2:03 PM   PAP / HPV   PAP (Historical)  NIL    NIL     HPV 16 DNA NEG^Negative  Negative      HPV 18 DNA NEG^Negative  Negative      Other HR HPV NEG^Negative  Negative        Reviewed and updated as  "needed this visit by clinical staff   Tobacco  Allergies  Meds  Problems  Med Hx  Surg Hx           Reviewed and updated as needed this visit by Provider    Allergies  Meds  Problems  Med Hx  Surg Hx          Past Medical History:   Diagnosis Date     Contact dermatitis and other eczema, due to unspecified cause     hands     RA (rheumatoid arthritis) (H)     diagnosed 12-15 years ago      History reviewed. No pertinent surgical history.  OB History    Para Term  AB Living   0 0 0 0 0 0   SAB IAB Ectopic Multiple Live Births   0 0 0 0 0       Review of Systems   Constitutional: Negative for chills and fever.   HENT: Negative for congestion, ear pain, hearing loss and sore throat.    Eyes: Negative for pain and visual disturbance.   Respiratory: Negative for cough and shortness of breath.    Cardiovascular: Negative for chest pain, palpitations and peripheral edema.   Gastrointestinal: Negative for abdominal pain, constipation, diarrhea, heartburn, hematochezia and nausea.   Breasts:  Negative for tenderness, breast mass and discharge.   Genitourinary: Positive for vaginal bleeding. Negative for dysuria, frequency, genital sores, hematuria, pelvic pain, urgency and vaginal discharge.   Musculoskeletal: Negative for arthralgias, joint swelling and myalgias.   Skin: Negative for rash.   Neurological: Negative for dizziness, weakness, headaches and paresthesias.   Psychiatric/Behavioral: Negative for mood changes. The patient is not nervous/anxious.           OBJECTIVE:   /84 (BP Location: Right arm, Patient Position: Chair, Cuff Size: Adult Regular)   Pulse 82   Temp 97.5  F (36.4  C) (Tympanic)   Resp 16   Ht 1.651 m (5' 5\")   Wt 79.4 kg (175 lb)   SpO2 98%   BMI 29.12 kg/m    Physical Exam  Constitutional:       Appearance: Normal appearance.   HENT:      Head: Normocephalic and atraumatic.      Right Ear: Tympanic membrane normal.      Left Ear: Tympanic membrane normal.      " Ears:      Comments: External canal on right ear shows a scab area with dry blood.     Mouth/Throat:      Mouth: Mucous membranes are moist.   Eyes:      Extraocular Movements: Extraocular movements intact.      Conjunctiva/sclera: Conjunctivae normal.      Pupils: Pupils are equal, round, and reactive to light.   Cardiovascular:      Rate and Rhythm: Normal rate and regular rhythm.      Pulses: Normal pulses.      Heart sounds: Normal heart sounds.   Pulmonary:      Effort: Pulmonary effort is normal. No respiratory distress.      Breath sounds: Normal breath sounds. No stridor. No wheezing, rhonchi or rales.   Chest:      Chest wall: No tenderness.   Musculoskeletal:         General: Normal range of motion.      Cervical back: Normal range of motion and neck supple.   Skin:     General: Skin is warm and dry.   Neurological:      Mental Status: She is alert and oriented to person, place, and time.   Psychiatric:         Mood and Affect: Mood normal.         Behavior: Behavior normal.           Diagnostic Test Results:  none     ASSESSMENT/PLAN:       ICD-10-CM    1. Routine general medical examination at a health care facility  Z00.00       2. Screen for colon cancer  Z12.11 Colonoscopy Screening  Referral      3. Recurrent major depressive disorder, in full remission (H)  F33.42 OFFICE/OUTPT VISIT,EST,LEVL IV      4. Rheumatoid arthritis of wrist, unspecified laterality, unspecified whether rheumatoid factor present (H)  M06.9 OFFICE/OUTPT VISIT,EST,LEVL IV      5. Moderate episode of recurrent major depressive disorder (H)  F33.1 sertraline (ZOLOFT) 100 MG tablet     Adult Mental Health  Referral     OFFICE/OUTPT VISIT,EST,LEVL IV      6. Insomnia, unspecified type  G47.00 OFFICE/OUTPT VISIT,EST,LEVL IV        Patient is a 48-year-old female here for annual physical.  Several issues were discussed today.  Counseling was referred therapy was provided to referral for her depression and anxiety.   "Medications were refilled.  Preventive measures were discussed.  Reminded of her mammogram and her colonoscopy.  Immunizations were updated today.  Smoking cessation was emphasized.    Patient has been advised of split billing requirements and indicates understanding: Yes      COUNSELING:  Reviewed preventive health counseling, as reflected in patient instructions       Regular exercise       Healthy diet/nutrition       Vision screening      BMI:   Estimated body mass index is 29.12 kg/m  as calculated from the following:    Height as of this encounter: 1.651 m (5' 5\").    Weight as of this encounter: 79.4 kg (175 lb).   Weight management plan: Discussed healthy diet and exercise guidelines      She reports that she has been smoking cigarettes. She has a 20.00 pack-year smoking history. She has never used smokeless tobacco.  Nicotine/Tobacco Cessation Plan:   Information offered: Patient not interested at this time           {Counseling Resources  [place] Preventive Services Task Force  Cholesterol Screening  Health diet/nutrition  Pooled Cohorts Equation Calculator  [employer]'s MyPlate  ASA Prophylaxis  Lung CA Screening  Osteoporosis prevention/bone health   {Breast Cancer Risk Calculator  BRCA-Related Cancer Risk Assessment FHS-7 Tool  Zac Farmer MD  [facility]  Answers for HPI/ROS submitted by the patient on 4/26/2023  If you checked off any problems, how difficult have these problems made it for you to do your work, take care of things at home, or get along with other people?: Not difficult at all  PHQ9 TOTAL SCORE: 3      "

## 2023-04-26 NOTE — NURSING NOTE
Prior to immunization administration, verified patients identity using patient s name and date of birth. Please see Immunization Activity for additional information.     Screening Questionnaire for Adult Immunization    Are you sick today?   No   Do you have allergies to medications, food, a vaccine component or latex?   No   Have you ever had a serious reaction after receiving a vaccination?   No   Do you have a long-term health problem with heart, lung, kidney, or metabolic disease (e.g., diabetes), asthma, a blood disorder, no spleen, complement component deficiency, a cochlear implant, or a spinal fluid leak?  Are you on long-term aspirin therapy?   No   Do you have cancer, leukemia, HIV/AIDS, or any other immune system problem?   No   Do you have a parent, brother, or sister with an immune system problem?   No   In the past 3 months, have you taken medications that affect  your immune system, such as prednisone, other steroids, or anticancer drugs; drugs for the treatment of rheumatoid arthritis, Crohn s disease, or psoriasis; or have you had radiation treatments?   No   Have you had a seizure, or a brain or other nervous system problem?   No   During the past year, have you received a transfusion of blood or blood    products, or been given immune (gamma) globulin or antiviral drug?   No   For women: Are you pregnant or is there a chance you could become       pregnant during the next month?   No   Have you received any vaccinations in the past 4 weeks?   No     Immunization questionnaire answers were all negative.      Injection of Prevnar 20 and Covid Booster given by Mireya Mayberry CMA. Patient instructed to remain in clinic for 15 minutes afterwards, and to report any adverse reactions.     Screening performed by Mireya Mayberry CMA on 4/26/2023 at 8:21 AM.

## 2023-05-23 ENCOUNTER — PATIENT OUTREACH (OUTPATIENT)
Dept: CARE COORDINATION | Facility: CLINIC | Age: 48
End: 2023-05-23
Payer: COMMERCIAL

## 2023-07-13 ENCOUNTER — MYC MEDICAL ADVICE (OUTPATIENT)
Dept: FAMILY MEDICINE | Facility: CLINIC | Age: 48
End: 2023-07-13
Payer: COMMERCIAL

## 2023-07-13 DIAGNOSIS — G47.09 OTHER INSOMNIA: Primary | ICD-10-CM

## 2023-07-14 RX ORDER — TRAZODONE HYDROCHLORIDE 50 MG/1
50 TABLET, FILM COATED ORAL
Qty: 90 TABLET | Refills: 1 | Status: SHIPPED | OUTPATIENT
Start: 2023-07-14 | End: 2024-01-08

## 2023-09-10 ENCOUNTER — HEALTH MAINTENANCE LETTER (OUTPATIENT)
Age: 48
End: 2023-09-10

## 2024-01-03 ENCOUNTER — TELEPHONE (OUTPATIENT)
Dept: FAMILY MEDICINE | Facility: CLINIC | Age: 49
End: 2024-01-03
Payer: COMMERCIAL

## 2024-01-03 NOTE — TELEPHONE ENCOUNTER
Patient Quality Outreach    Patient is due for the following:   Colon Cancer Screening    Next Steps:   Reminder letter about the colonoscopy.    Type of outreach:    Sent letter.    Next Steps:  Reach out within 90 days via Letter.    Max number of attempts reached: No. Will try again in 90 days if patient still on fail list.    Questions for provider review:    None           Mireya Mayberry, [facility]  Chart routed to none, letter sent.

## 2024-01-08 ENCOUNTER — MYC REFILL (OUTPATIENT)
Dept: FAMILY MEDICINE | Facility: CLINIC | Age: 49
End: 2024-01-08
Payer: COMMERCIAL

## 2024-01-08 DIAGNOSIS — G47.09 OTHER INSOMNIA: ICD-10-CM

## 2024-01-08 NOTE — TELEPHONE ENCOUNTER
Routing refill request to provider for review/approval because:  Drug interaction warning    Requested Prescriptions   Pending Prescriptions Disp Refills    traZODone (DESYREL) 50 MG tablet 90 tablet 0     Sig: Take 1 tablet (50 mg) by mouth nightly as needed for sleep       Serotonin Modulators Passed - 1/8/2024  6:47 AM        Passed - Recent (12 mo) or future (30 days) visit within the authorizing provider's specialty     The patient must have completed an in-person or virtual visit within the past 12 months or has a future visit scheduled within the next 90 days with the authorizing provider s specialty.  Urgent care and e-visits do not quality as an office visit for this protocol.          Passed - Medication is active on med list        Passed - Patient is age 18 or older        Passed - No active pregnancy on record        Passed - No positive pregnancy test in past 12 months

## 2024-01-09 ENCOUNTER — HOSPITAL ENCOUNTER (OUTPATIENT)
Dept: MAMMOGRAPHY | Facility: CLINIC | Age: 49
Discharge: HOME | End: 2024-01-09
Attending: FAMILY MEDICINE | Admitting: FAMILY MEDICINE
Payer: COMMERCIAL

## 2024-01-09 DIAGNOSIS — Z12.31 VISIT FOR SCREENING MAMMOGRAM: ICD-10-CM

## 2024-01-09 PROCEDURE — 77063 BREAST TOMOSYNTHESIS BI: CPT

## 2024-01-09 RX ORDER — TRAZODONE HYDROCHLORIDE 50 MG/1
50 TABLET, FILM COATED ORAL
Qty: 90 TABLET | Refills: 0 | Status: SHIPPED | OUTPATIENT
Start: 2024-01-09 | End: 2024-04-23

## 2024-02-09 ENCOUNTER — HOSPITAL ENCOUNTER (EMERGENCY)
Facility: CLINIC | Age: 49
Discharge: HOME | End: 2024-02-09
Attending: STUDENT IN AN ORGANIZED HEALTH CARE EDUCATION/TRAINING PROGRAM | Admitting: STUDENT IN AN ORGANIZED HEALTH CARE EDUCATION/TRAINING PROGRAM
Payer: COMMERCIAL

## 2024-02-09 VITALS
OXYGEN SATURATION: 98 % | DIASTOLIC BLOOD PRESSURE: 61 MMHG | SYSTOLIC BLOOD PRESSURE: 116 MMHG | HEART RATE: 59 BPM | RESPIRATION RATE: 16 BRPM | TEMPERATURE: 97.6 F

## 2024-02-09 DIAGNOSIS — K11.20 SIALADENITIS: ICD-10-CM

## 2024-02-09 PROCEDURE — 99213 OFFICE O/P EST LOW 20 MIN: CPT | Performed by: STUDENT IN AN ORGANIZED HEALTH CARE EDUCATION/TRAINING PROGRAM

## 2024-02-09 PROCEDURE — G0463 HOSPITAL OUTPT CLINIC VISIT: HCPCS | Performed by: STUDENT IN AN ORGANIZED HEALTH CARE EDUCATION/TRAINING PROGRAM

## 2024-02-09 NOTE — ED PROVIDER NOTES
History   No chief complaint on file.    HPI  Sofia Garcia is a 48 year old female who has rheumatoid arthritis who presents to the emergency department with concern for salivary gland infection.  Patient states that today she began having some pain and swelling just under her left jaw near her ear.  She states this is similar to 10 years ago when she had a parotid gland infection.  She was given antibiotics at that time.  That visit was reviewed and she was prescribed Augmentin for parotitis.  She states that she waited longer that time and it was more red, painful and swollen.  She states now the area hurts whenever she tries to eat or drink something.  She states it seemed swollen earlier, but is no longer swollen.  She denies fever.  No troubles moving or turning her neck.  No troubles swallowing or breathing.    Allergies:  No Known Allergies    Problem List:    Patient Active Problem List    Diagnosis Date Noted    Recurrent major depressive disorder, in full remission (H24) 04/26/2023     Priority: Medium    RA (rheumatoid arthritis) (H) 06/20/2019     Priority: Medium    Hypovitaminosis D 06/20/2019     Priority: Medium    Encounter for insertion of mirena IUD 10/20/2017     Priority: Medium     Lot # [id number] Expiration date 04/20      Excessive or frequent menstruation 05/26/2016     Priority: Medium    Major depression in complete remission (H) 04/18/2016     Priority: Medium    Anxiety state 04/02/2007     Priority: Medium     Overview:   Epic       Arthropathy 06/05/2006     Priority: Medium     Problem list name updated by automated process. Provider to review      Tobacco use disorder 09/19/2005     Priority: Medium        Past Medical History:    Past Medical History:   Diagnosis Date    Contact dermatitis and other eczema, due to unspecified cause     RA (rheumatoid arthritis) (H)        Past Surgical History:    No past surgical history on file.    Family History:    Family History   Problem  Relation Age of Onset    Cancer Mother         pancreatic    C.A.D. Father         CABG, onset heart disease in late 40s or early 50s    Neuropathy Sister         MS    Cancer Paternal Aunt         liver cancer       Social History:  Marital Status:  Single [1]  Social History     Tobacco Use    Smoking status: Every Day     Packs/day: 1.00     Years: 20.00     Additional pack years: 0.00     Total pack years: 20.00     Types: Cigarettes    Smokeless tobacco: Never   Vaping Use    Vaping Use: Never used   Substance Use Topics    Alcohol use: Yes     Comment: very rarely    Drug use: No        Medications:    amoxicillin-clavulanate (AUGMENTIN) 875-125 MG tablet  cetirizine (ZYRTEC) 10 MG tablet  sertraline (ZOLOFT) 100 MG tablet  traZODone (DESYREL) 50 MG tablet          Review of Systems  See HPI  Physical Exam   BP: 116/61  Pulse: 59  Temp: 97.6  F (36.4  C)  Resp: 16  SpO2: 98 %      Physical Exam  /61   Pulse 59   Temp 97.6  F (36.4  C) (Tympanic)   Resp 16   SpO2 98%   General: alert, interactive, in no apparent distress  Head: atraumatic.  Mild tenderness on palpation of the left parotid gland.  No obvious swelling.  No erythema or fluctuance.  Nose: no rhinorrhea or epistaxis  Ears: no external auditory canal discharge or bleeding.    Eyes: Sclera nonicteric. Conjunctiva noninjected. PERRL, EOMI  Mouth: No intraoral lesions.  No obvious salivary stone noted.  No tonsillar erythema, edema, or exudate.  Moist mucous membranes.  No submandibular firmness or tenderness.  Neck: supple, moving spontaneously no midline cervical tenderness  Lungs: No increased work of breathing.  Clear to auscultation bilaterally.  CV: RRR, peripheral pulses palpable and symmetric  Extremities: Warm and well-perfused..  Skin: no rash or diaphoresis  Neuro: CN II-XII grossly intact    ED Course                 Procedures           Critical Care time:  none         No results found for this or any previous visit (from the  past 24 hour(s)).    Medications - No data to display    Assessments & Plan (with Medical Decision Making)     I have reviewed the nursing notes.    I have reviewed the findings, diagnosis, plan and need for follow up with the patient.    Medical Decision Making  Sofia Garcia is a 48 year old female who has rheumatoid arthritis who presents to the emergency department with concern for salivary gland infection.  Vital signs are reviewed and are reassuring.  Patient actually has a fairly reassuring exam.  She has some very mild tenderness on palpation of her parotid gland, but no clear signs of infection.  No systemic symptoms.  There is no erythema or swelling.  I do not see any obvious salivary stone.  Given her symptoms though, I have a strong suspicion for salivary stone.  It is possible that she has an early infection.  She states that last time the area was much more red and swollen.  I am not sure that antibiotics would be very helpful right now.  In the meantime I have encouraged the patient to drink plenty of fluids and try to suck on sour candies to help stimulate salivary secretion.  I have given her prescription for Augmentin as a wait-and-see prescription.  She is only to fill this if she develops redness or swelling.  Additional anticipatory guidance discussed.  I recommend close outpatient follow-up.  Return precautions discussed.  All questions answered.  Patient discharged in stable condition.        Discharge Medication List as of 2/9/2024  6:10 PM        START taking these medications    Details   amoxicillin-clavulanate (AUGMENTIN) 875-125 MG tablet Take 1 tablet by mouth 2 times daily for 10 days, Disp-20 tablet, R-0, E-Prescribe             Final diagnoses:   Sialadenitis       2/9/2024   [facility] EMERGENCY DEPT       Gentry Howard MD  02/09/24 0926

## 2024-02-10 NOTE — DISCHARGE INSTRUCTIONS
I suspect that you have a small stone stuck in the duct of your salivary gland.  This can be treated by gently massaging the gland as well as eating sour candies to help stimulate salivary gland.  I do not think it is infected right now, but is at risk of becoming infected.  I have prescribed you a wait-and-see antibiotic.  If the area becomes red or swollen you should start taking the antibiotic.  You do not need to start this antibiotic now and you do not need to fill it if your symptoms resolve on their own.  Follow-up with your regular doctor with ongoing concerns.  If you develop fever or any other new or concerning symptoms return to the ER.

## 2024-03-27 ENCOUNTER — PATIENT OUTREACH (OUTPATIENT)
Dept: CARE COORDINATION | Facility: CLINIC | Age: 49
End: 2024-03-27
Payer: COMMERCIAL

## 2024-04-10 ENCOUNTER — PATIENT OUTREACH (OUTPATIENT)
Dept: CARE COORDINATION | Facility: CLINIC | Age: 49
End: 2024-04-10
Payer: COMMERCIAL

## 2024-04-23 ENCOUNTER — OFFICE VISIT (OUTPATIENT)
Dept: FAMILY MEDICINE | Facility: CLINIC | Age: 49
End: 2024-04-23
Payer: COMMERCIAL

## 2024-04-23 VITALS
HEIGHT: 65 IN | OXYGEN SATURATION: 98 % | SYSTOLIC BLOOD PRESSURE: 104 MMHG | BODY MASS INDEX: 28.82 KG/M2 | DIASTOLIC BLOOD PRESSURE: 68 MMHG | TEMPERATURE: 98.1 F | HEART RATE: 62 BPM | RESPIRATION RATE: 16 BRPM | WEIGHT: 173 LBS

## 2024-04-23 DIAGNOSIS — B36.9 FUNGAL RASH OF TRUNK: ICD-10-CM

## 2024-04-23 DIAGNOSIS — F33.1 MODERATE EPISODE OF RECURRENT MAJOR DEPRESSIVE DISORDER (H): Primary | ICD-10-CM

## 2024-04-23 DIAGNOSIS — G47.09 OTHER INSOMNIA: ICD-10-CM

## 2024-04-23 DIAGNOSIS — Z12.11 SCREEN FOR COLON CANCER: ICD-10-CM

## 2024-04-23 DIAGNOSIS — M06.9 RHEUMATOID ARTHRITIS OF WRIST, UNSPECIFIED LATERALITY, UNSPECIFIED WHETHER RHEUMATOID FACTOR PRESENT (H): ICD-10-CM

## 2024-04-23 PROCEDURE — 99214 OFFICE O/P EST MOD 30 MIN: CPT | Performed by: FAMILY MEDICINE

## 2024-04-23 RX ORDER — SERTRALINE HYDROCHLORIDE 100 MG/1
200 TABLET, FILM COATED ORAL DAILY
Qty: 180 TABLET | Refills: 3 | Status: SHIPPED | OUTPATIENT
Start: 2024-04-23

## 2024-04-23 RX ORDER — CLOTRIMAZOLE AND BETAMETHASONE DIPROPIONATE 10; .64 MG/G; MG/G
CREAM TOPICAL 2 TIMES DAILY
Qty: 45 G | Refills: 0 | Status: SHIPPED | OUTPATIENT
Start: 2024-04-23

## 2024-04-23 RX ORDER — TRAZODONE HYDROCHLORIDE 50 MG/1
50 TABLET, FILM COATED ORAL
Qty: 90 TABLET | Refills: 2 | Status: SHIPPED | OUTPATIENT
Start: 2024-04-23

## 2024-04-23 ASSESSMENT — PATIENT HEALTH QUESTIONNAIRE - PHQ9
SUM OF ALL RESPONSES TO PHQ QUESTIONS 1-9: 1
SUM OF ALL RESPONSES TO PHQ QUESTIONS 1-9: 1
10. IF YOU CHECKED OFF ANY PROBLEMS, HOW DIFFICULT HAVE THESE PROBLEMS MADE IT FOR YOU TO DO YOUR WORK, TAKE CARE OF THINGS AT HOME, OR GET ALONG WITH OTHER PEOPLE: NOT DIFFICULT AT ALL

## 2024-04-23 ASSESSMENT — ANXIETY QUESTIONNAIRES
GAD7 TOTAL SCORE: 3
GAD7 TOTAL SCORE: 3
6. BECOMING EASILY ANNOYED OR IRRITABLE: SEVERAL DAYS
7. FEELING AFRAID AS IF SOMETHING AWFUL MIGHT HAPPEN: NOT AT ALL
5. BEING SO RESTLESS THAT IT IS HARD TO SIT STILL: NOT AT ALL
8. IF YOU CHECKED OFF ANY PROBLEMS, HOW DIFFICULT HAVE THESE MADE IT FOR YOU TO DO YOUR WORK, TAKE CARE OF THINGS AT HOME, OR GET ALONG WITH OTHER PEOPLE?: NOT DIFFICULT AT ALL
7. FEELING AFRAID AS IF SOMETHING AWFUL MIGHT HAPPEN: NOT AT ALL
IF YOU CHECKED OFF ANY PROBLEMS ON THIS QUESTIONNAIRE, HOW DIFFICULT HAVE THESE PROBLEMS MADE IT FOR YOU TO DO YOUR WORK, TAKE CARE OF THINGS AT HOME, OR GET ALONG WITH OTHER PEOPLE: NOT DIFFICULT AT ALL
GAD7 TOTAL SCORE: 3
4. TROUBLE RELAXING: NOT AT ALL
1. FEELING NERVOUS, ANXIOUS, OR ON EDGE: SEVERAL DAYS
2. NOT BEING ABLE TO STOP OR CONTROL WORRYING: NOT AT ALL
3. WORRYING TOO MUCH ABOUT DIFFERENT THINGS: SEVERAL DAYS

## 2024-04-23 ASSESSMENT — PAIN SCALES - GENERAL: PAINLEVEL: MILD PAIN (3)

## 2024-04-23 NOTE — PROGRESS NOTES
"  Assessment & Plan     (F33.1) Moderate episode of recurrent major depressive disorder (H)  (primary encounter diagnosis)  Comment: medication faxed, appears to be helping.   Plan: sertraline (ZOLOFT) 100 MG tablet            (Z12.11) Screen for colon cancer  Comment: reminded of colonoscopy  Plan: Colonoscopy Screening  Referral            (G47.09) Other insomnia  Comment: medication faxed.   Plan: traZODone (DESYREL) 50 MG tablet            (M06.9) Rheumatoid arthritis of wrist, unspecified laterality, unspecified whether rheumatoid factor present (H)  Comment: stable, no new symptoms.   Plan: As above    (B36.9) Fungal rash of trunk  Comment: Medication faxed for rash.   Plan: clotrimazole-betamethasone (LOTRISONE) 1-0.05 %        external cream                    Nicotine/Tobacco Cessation  She reports that she has been smoking cigarettes. She has a 20 pack-year smoking history. She has never used smokeless tobacco.  Nicotine/Tobacco Cessation Plan  Information offered: Patient not interested at this time      BMI  Estimated body mass index is 29.24 kg/m  as calculated from the following:    Height as of this encounter: 1.638 m (5' 4.5\").    Weight as of this encounter: 78.5 kg (173 lb).   Weight management plan: Discussed healthy diet and exercise guidelines      FUTURE APPOINTMENTS:       - Follow-up visit as needed.    Nikole Black is a 49 year old, presenting for the following health issues:  Patient is a 49-year-old female presenting to the clinic today for a recheck on depression anxiety.  She needs refills on her sertraline which appears to be helping the symptoms.  She is yet to make an appointment with a therapist which I have offered for her in the past.  She reports no side effects to medications.  She also mentioned that trazodone seems to be helping out with sleeping better.  She wakes up more energized.  She denies any side effects.  She is also wanting a point attention to the " rash underneath her left breast this started a couple weeks ago.  It is not itchy but it is red irritated irritated she used the cream that she was prescribed a while back but she thinks it may be .  No new soaps or detergents.    Patient was reminded of colonoscopy , she plan to get this done.    Depression (Recheck on depression and anxiety.), Derm Problem (She has been exercising and will have a rash under the breast, mostly the left side.  Some on the right side.  She was prescribed a cream awhile ago to use in the breast area for a fungal issue.  This has been for the past couple of months and that is when she started exercising.  The previous cream seems to help some but she has not been applying twice daily.  Then is has a greasy feeling when putting on her bra.  ), and Possible allergy (She is not taking Ibuprofen as they felt this could have caused her hives.  Just taking Tylenol as needed.)        2024     9:21 AM   Additional Questions   Roomed by Mireya Mayberry CMA     Chief Complaint   Patient presents with    Depression     Recheck on depression and anxiety.    Derm Problem     She has been exercising and will have a rash under the breast, mostly the left side.  Some on the right side.  She was prescribed a cream awhile ago to use in the breast area for a fungal issue.  This has been for the past couple of months and that is when she started exercising.  The previous cream seems to help some but she has not been applying twice daily.  Then is has a greasy feeling when putting on her bra.      Possible allergy     She is not taking Ibuprofen as they felt this could have caused her hives.  Just taking Tylenol as needed.       History of Present Illness       Mental Health Follow-up:  Patient presents to follow-up on Depression & Anxiety.Patient's depression since last visit has been:  No change  The patient is not having other symptoms associated with depression.  Patient's anxiety since  "last visit has been:  Medium  The patient is not having other symptoms associated with anxiety.  Any significant life events: No  Patient is feeling anxious or having panic attacks.  Patient has no concerns about alcohol or drug use.    She eats 2-3 servings of fruits and vegetables daily.She consumes 2 sweetened beverage(s) daily.She exercises with enough effort to increase her heart rate 60 or more minutes per day.  She exercises with enough effort to increase her heart rate 5 days per week.   She is taking medications regularly.               Review of Systems  CONSTITUTIONAL: NEGATIVE for fever, chills, change in weight  INTEGUMENTARY/SKIN: POSITIVE for rash trunk  ENT/MOUTH: NEGATIVE for ear, mouth and throat problems  RESP: NEGATIVE for significant cough or SOB  CV: NEGATIVE for chest pain, palpitations or peripheral edema  MUSCULOSKELETAL: NEGATIVE for significant arthralgias or myalgia  NEURO: NEGATIVE for weakness, dizziness or paresthesias  ENDOCRINE: NEGATIVE for temperature intolerance, skin/hair changes  HEME/ALLERGY/IMMUNE: NEGATIVE for bleeding problems  PSYCHIATRIC: NEGATIVE for changes in mood or affect      Objective    /68 (BP Location: Right arm, Patient Position: Chair, Cuff Size: Adult Regular)   Pulse 62   Temp 98.1  F (36.7  C) (Tympanic)   Resp 16   Ht 1.638 m (5' 4.5\")   Wt 78.5 kg (173 lb)   SpO2 98%   BMI 29.24 kg/m    Body mass index is 29.24 kg/m .  Physical Exam   GENERAL: alert and no distress  NECK: no adenopathy, no asymmetry, masses, or scars  RESP: lungs clear to auscultation - no rales, rhonchi or wheezes  CV: regular rate and rhythm, normal S1 S2, no S3 or S4, no murmur, click or rub, no peripheral edema  ABDOMEN: soft, nontender, no hepatosplenomegaly, no masses and bowel sounds normal  MS: no gross musculoskeletal defects noted, no edema  SKIN: macule - breast            Signed Electronically by: Zac Farmer MD    "

## 2024-06-16 ENCOUNTER — HEALTH MAINTENANCE LETTER (OUTPATIENT)
Age: 49
End: 2024-06-16

## 2024-07-31 ENCOUNTER — TELEPHONE (OUTPATIENT)
Dept: FAMILY MEDICINE | Facility: CLINIC | Age: 49
End: 2024-07-31
Payer: COMMERCIAL

## 2024-07-31 NOTE — LETTER
July 31, 2024    To  Sofia Garcia  [address] [address] [address]    Your team at [facility] cares about your health. We have reviewed your chart and based on our findings; we are making the following recommendations to better manage your health.     You are in particular need of attention regarding the following:   Please call [phone] to schedule the colonoscopy at your conveniance.     Colon cancer is now the second leading cause of cancer-related deaths in the [place] for both men and women and there are over 130,000 new cases and 50,000 deaths per year from colon cancer. Colonoscopies can prevent 90-95% of these deaths. Problem lesions can be removed before they ever become cancer. This test is not only looking for cancer, but also getting rid of precancerous lesions.   If you are under/uninsured, we recommend you contact the "[employer] Program."[employer] is a free colorectal cancer screening program that provides colonoscopies for eligible under/uninsured [place] men and women. If you are interested in receiving a free colonoscopy, please call "[employer] at t [phone] (mention code [employer]) to see if you're eligible. Please have them send us the results.   PREVENTATIVE VISIT: Physical    If you have already completed these items, please contact the clinic via phone or   [employer] so your care team can review and update your records. Thank you for   choosing [facility] for your healthcare needs. For any questions,   concerns, or to schedule an appointment please contact our clinic.    Healthy Regards,    Your [facility] Care Team

## 2024-07-31 NOTE — TELEPHONE ENCOUNTER
Patient Quality Outreach    Patient is due for the following:   Colon Cancer Screening  Physical Preventive Adult Physical    Next Steps:   Schedule a Adult Preventative    Type of outreach:    Sent letter.    Next Steps:  Reach out within 90 days via Letter.    Max number of attempts reached: No. Will try again in 90 days if patient still on fail list.    Questions for provider review:    None           Mireya Mayberry, [facility]  Chart routed to none, letter sent.

## 2024-09-10 ENCOUNTER — TELEPHONE (OUTPATIENT)
Dept: OBGYN | Facility: CLINIC | Age: 49
End: 2024-09-10
Payer: COMMERCIAL

## 2024-09-10 DIAGNOSIS — Z30.433 ENCOUNTER FOR IUD REMOVAL AND REINSERTION: Primary | ICD-10-CM

## 2024-09-10 NOTE — TELEPHONE ENCOUNTER
Patient requesting Lorazepam prior to IUD removal. Please review and advise.  Appointment is scheduled for 10/4 with Dr. Teran.    Thank you.    Zohra DEGROOT RN   [facility]

## 2024-09-10 NOTE — TELEPHONE ENCOUNTER
[facility] Call Center    Phone Message    May a detailed message be left on voicemail: yes     Reason for Call:  Patient is calling to request a possible medication to keep her relaxed during her IUD removal. Please advise. Thank you    Action Taken: Message routed to:  Other: [facility]    Travel Screening: Not Applicable     Date of Service:

## 2024-09-11 RX ORDER — LORAZEPAM 1 MG/1
1 TABLET ORAL ONCE
Qty: 1 TABLET | Refills: 0 | Status: SHIPPED | OUTPATIENT
Start: 2024-09-11 | End: 2024-09-11

## 2024-10-04 ENCOUNTER — OFFICE VISIT (OUTPATIENT)
Dept: OBGYN | Facility: CLINIC | Age: 49
End: 2024-10-04
Payer: COMMERCIAL

## 2024-10-04 VITALS
BODY MASS INDEX: 31.62 KG/M2 | WEIGHT: 189.8 LBS | TEMPERATURE: 98.1 F | HEART RATE: 80 BPM | DIASTOLIC BLOOD PRESSURE: 81 MMHG | RESPIRATION RATE: 18 BRPM | SYSTOLIC BLOOD PRESSURE: 112 MMHG | HEIGHT: 65 IN

## 2024-10-04 DIAGNOSIS — Z30.430 ENCOUNTER FOR INSERTION OF MIRENA IUD: ICD-10-CM

## 2024-10-04 DIAGNOSIS — Z30.433 ENCOUNTER FOR REMOVAL AND REINSERTION OF INTRAUTERINE CONTRACEPTIVE DEVICE: Primary | ICD-10-CM

## 2024-10-04 DIAGNOSIS — N93.9 ABNORMAL UTERINE BLEEDING (AUB): ICD-10-CM

## 2024-10-04 PROCEDURE — 58301 REMOVE INTRAUTERINE DEVICE: CPT | Performed by: OBSTETRICS & GYNECOLOGY

## 2024-10-04 PROCEDURE — 99459 PELVIC EXAMINATION: CPT | Performed by: OBSTETRICS & GYNECOLOGY

## 2024-10-04 PROCEDURE — 58300 INSERT INTRAUTERINE DEVICE: CPT | Performed by: OBSTETRICS & GYNECOLOGY

## 2024-10-04 PROCEDURE — 88305 TISSUE EXAM BY PATHOLOGIST: CPT | Performed by: STUDENT IN AN ORGANIZED HEALTH CARE EDUCATION/TRAINING PROGRAM

## 2024-10-04 PROCEDURE — 99202 OFFICE O/P NEW SF 15 MIN: CPT | Mod: 25 | Performed by: OBSTETRICS & GYNECOLOGY

## 2024-10-04 RX ORDER — LORAZEPAM 0.5 MG/1
0.5 TABLET ORAL EVERY 6 HOURS PRN
COMMUNITY

## 2024-10-04 NOTE — ASSESSMENT & PLAN NOTE
Clinic Administered Medication Documentation      Intrauterine/Implant Insertion Documentation    Device was placed by provider (please see MAR for given by information). Please see MAR and medication order for additional information.     Type: Mirena  Remove/Replace by: Removed and replaced 10/04/2024    Expiration Date:  10/2026

## 2024-10-04 NOTE — PROGRESS NOTES
Gynecology Consult Note      HPI: Sofia Bonner is a 49 year old who presents for IUD removal and reinsertion.  The patient thinks that she would like to have IUD removed but questions whether there are any safety concerns given that she is 49.  She does continue to have occasional spotting with the IUD in place.  Nothing bothersome or concerning to her.  Otherwise feeling well and denies concerns today.    ROS: 10 pt ROS neg other than HPI    PMH:   Past Medical History:   Diagnosis Date    Contact dermatitis and other eczema, due to unspecified cause     hands    RA (rheumatoid arthritis) (H)     diagnosed 12-15 years ago       PSHx:   History reviewed. No pertinent surgical history.    Medications:   Current Outpatient Medications   Medication Sig Dispense Refill    Acetaminophen 325 MG CAPS Take 325-650 mg by mouth every 4 hours as needed.      cetirizine (ZYRTEC) 10 MG tablet Take 10 mg by mouth daily      levonorgestrel (MIRENA) 52 MG (20 mcg/day) IUD 1 each by Intrauterine route once.      LORazepam (ATIVAN) 0.5 MG tablet Take 0.5 mg by mouth every 6 hours as needed for anxiety.      sertraline (ZOLOFT) 100 MG tablet Take 2 tablets (200 mg) by mouth daily 180 tablet 3    clotrimazole-betamethasone (LOTRISONE) 1-0.05 % external cream Apply topically 2 times daily (Patient not taking: Reported on 10/4/2024) 45 g 0    traZODone (DESYREL) 50 MG tablet Take 1 tablet (50 mg) by mouth nightly as needed for sleep (Patient not taking: Reported on 10/4/2024) 90 tablet 2     No current facility-administered medications for this visit.        Allergies:    No Known Allergies    Social History:   Social History     Socioeconomic History    Marital status: Single     Spouse name: Not on file    Number of children: Not on file    Years of education: Not on file    Highest education level: Not on file   Occupational History    Not on file   Tobacco Use    Smoking status: Every Day     Current packs/day: 1.00     Average  packs/day: 1 pack/day for 20.0 years (20.0 ttl pk-yrs)     Types: Cigarettes    Smokeless tobacco: Never    Tobacco comments:     1 ppd   Vaping Use    Vaping status: Never Used   Substance and Sexual Activity    Alcohol use: Yes     Comment: very rarely    Drug use: No    Sexual activity: Yes     Partners: Male     Birth control/protection: Male Surgical     Comment: stopped taking birth control September 2015   Other Topics Concern    Parent/sibling w/ CABG, MI or angioplasty before 65F 55M? Yes     Comment: father   Social History Narrative    September 13, 2019    ENVIRONMENTAL HISTORY: [place] The home is heated with a forced air and gas furnace. They do have central air conditioning. The patient's bedroom is furnished with carpeting in bedroom and fabric window coverings.  Pets inside the house include 1 cat and 1 dog. There is no history of cockroach or mice infestation. There is 1 smoker, smokes outside/garage.  The house does not have a damp basement.      Social Determinants of Health     Financial Resource Strain: Not on file   Food Insecurity: Not on file   Transportation Needs: Not on file   Physical Activity: Not on file   Stress: Not on file   Social Connections: Not on file   Interpersonal Safety: Low Risk  (4/23/2024)    Interpersonal Safety     Do you feel physically and emotionally safe where you currently live?: Yes     Within the past 12 months, have you been hit, slapped, kicked or otherwise physically hurt by someone?: No     Within the past 12 months, have you been humiliated or emotionally abused in other ways by your partner or ex-partner?: No   Housing Stability: Not on file       Family History:  Family History   Problem Relation Age of Onset    Cancer Mother         pancreatic    C.A.D. Father         CABG, onset heart disease in late 40s or early 50s    Neuropathy Sister         MS    Cancer Paternal Aunt         liver cancer  "      Physical Exam:   Vitals:    10/04/24 1027   BP: 112/81   BP Location: Right arm   Patient Position: Sitting   Cuff Size: Adult Regular   Pulse: 80   Resp: 18   Temp: 98.1  F (36.7  C)   TempSrc: Tympanic   Weight: 86.1 kg (189 lb 12.8 oz)   Height: 1.638 m (5' 4.5\")      Gen: lying in bed, NAD  CV: Reg rate, well perfused  Pulm: no increased work of breathing  Abd: non-tender, non-distended, no masses   Pelvis: normal appearing external genitalia, vaginal mucosa, cervix, bimanual exam with normal size and contour of uterus with no adnexal masses, cervix did have 1 cm polyp that pt was verbally consented for removal of and removed with ring in standard fashion.   Extremities: non-tender, no erythema; no edema  Psych: normal mood and affect  Neuro: no focal deficits    IUD Removal and Replacement  Procedure Note    Sofia Bonner  1975  [id number]    The patient was counseled on the risks (including including risk of infection, uterine perforation, cramping), benefits (high efficacy, low maintenance birth control, reduced risk of uterine cancer and hyperplasia, improvement in menstrual bleeding), and alternatives of the procedure . Desires to continue this method. Verbal and written consent were obtained.  Pt declines UPT-  with vasectomy, IUD not .    Technique: The patient was placed in the dorsal lithotomy position.  A speculum was placed in the vagina and the cervix with IUD strings were visualized, grasped with a ring forcep and removed intact. The IUD was placed in a sterile cup and disposed in hazardous waste. The Mirena IUD was loaded, advanced to the fundus, pulled back 1cm, deployed and advanced to the fundus. Using the insertor as a sound, the uterus sounded to 8.5 cm. IUD strings were cut 3cm from the external cervical os. The patient tolerated the procedure well and there were no complications. EBL: 0cc.     Discussed option of returning to clinic for a string check. Pt will " attempt at home and return if not able to palpate    A&P: Sofia Bonner is a 49 year old who presents for IUD removal and reinsertion.  Patient initially questions whether she should have IUD removed and replaced.  She states that she continues to have some occasional spotting with IUD in place.  Wanted to confirm safety of IUD.  Discussed with patient that IUD is progesterone only with minimal systemic absorption of the progesterone.  Discussed that can have beneficial impacts with regard to preventing endometrial cancer and no significant risk of VTE given is not estrogen-containing.  Did review that occasionally IUDs are in place several years past menopause without concern.  Patient then agrees that she would like to have IUD removed and new 1 replaced.  This was completed as above without difficulty.  Did also discussed with the patient  that if menses/bleeding stops altogether and she is wondering whether IUD could be removed could consider FSH level.    Separate from the above procedure spent 20 minutes reviewing chart, obtaining history, counseling, examining, coordinating care, documenting regarding whether IUD needs to be replaced.    Note incidental cervical polyp found during examination and removed with patient permission and sent to pathology per routine.  Karine Teran MD   10/4/2024 2:47 PM

## 2024-10-04 NOTE — NURSING NOTE
"Initial /81 (BP Location: Right arm, Patient Position: Sitting, Cuff Size: Adult Regular)   Pulse 80   Temp 98.1  F (36.7  C) (Tympanic)   Resp 18   Ht 1.638 m (5' 4.5\")   Wt 86.1 kg (189 lb 12.8 oz)   BMI 32.08 kg/m   Estimated body mass index is 32.08 kg/m  as calculated from the following:    Height as of this encounter: 1.638 m (5' 4.5\").    Weight as of this encounter: 86.1 kg (189 lb 12.8 oz). .    "

## 2024-10-08 LAB
PATH REPORT.COMMENTS IMP SPEC: NORMAL
PATH REPORT.COMMENTS IMP SPEC: NORMAL
PATH REPORT.FINAL DX SPEC: NORMAL
PATH REPORT.GROSS SPEC: NORMAL
PATH REPORT.MICROSCOPIC SPEC OTHER STN: NORMAL
PATH REPORT.RELEVANT HX SPEC: NORMAL
PHOTO IMAGE: NORMAL

## 2024-10-23 ENCOUNTER — PATIENT OUTREACH (OUTPATIENT)
Dept: CARE COORDINATION | Facility: CLINIC | Age: 49
End: 2024-10-23
Payer: COMMERCIAL

## 2024-11-22 ENCOUNTER — TRANSFERRED RECORDS (OUTPATIENT)
Dept: HEALTH INFORMATION MANAGEMENT | Facility: CLINIC | Age: 49
End: 2024-11-22
Payer: COMMERCIAL

## 2024-12-04 ENCOUNTER — HOSPITAL ENCOUNTER (OUTPATIENT)
Dept: CT IMAGING | Facility: HOSPITAL | Age: 49
Discharge: HOME | End: 2024-12-04
Attending: INTERNAL MEDICINE
Payer: COMMERCIAL

## 2024-12-04 DIAGNOSIS — Z12.11 ENCOUNTER FOR SCREENING COLONOSCOPY: ICD-10-CM

## 2024-12-04 DIAGNOSIS — K63.9 CECAL LESION: ICD-10-CM

## 2024-12-04 PROCEDURE — 250N000011 HC RX IP 250 OP 636: Performed by: INTERNAL MEDICINE

## 2024-12-04 PROCEDURE — 250N000009 HC RX 250: Performed by: INTERNAL MEDICINE

## 2024-12-04 PROCEDURE — 74177 CT ABD & PELVIS W/CONTRAST: CPT

## 2024-12-04 RX ORDER — IOPAMIDOL 755 MG/ML
90 INJECTION, SOLUTION INTRAVASCULAR ONCE
Status: COMPLETED | OUTPATIENT
Start: 2024-12-04 | End: 2024-12-04

## 2024-12-04 RX ADMIN — IOPAMIDOL 90 ML: 755 INJECTION, SOLUTION INTRAVENOUS at 07:47

## 2024-12-04 RX ADMIN — SODIUM CHLORIDE 72 ML: 9 INJECTION, SOLUTION INTRAVENOUS at 07:48

## 2024-12-11 ENCOUNTER — TRANSFERRED RECORDS (OUTPATIENT)
Dept: HEALTH INFORMATION MANAGEMENT | Facility: CLINIC | Age: 49
End: 2024-12-11
Payer: COMMERCIAL

## 2025-01-25 ENCOUNTER — HOSPITAL ENCOUNTER (OUTPATIENT)
Dept: MAMMOGRAPHY | Facility: CLINIC | Age: 50
Discharge: HOME | End: 2025-01-25
Attending: FAMILY MEDICINE | Admitting: FAMILY MEDICINE
Payer: COMMERCIAL

## 2025-01-25 DIAGNOSIS — Z12.31 VISIT FOR SCREENING MAMMOGRAM: ICD-10-CM

## 2025-01-25 PROCEDURE — 77063 BREAST TOMOSYNTHESIS BI: CPT

## 2025-06-21 ENCOUNTER — HEALTH MAINTENANCE LETTER (OUTPATIENT)
Age: 50
End: 2025-06-21

## 2025-06-25 ASSESSMENT — ANXIETY QUESTIONNAIRES
8. IF YOU CHECKED OFF ANY PROBLEMS, HOW DIFFICULT HAVE THESE MADE IT FOR YOU TO DO YOUR WORK, TAKE CARE OF THINGS AT HOME, OR GET ALONG WITH OTHER PEOPLE?: NOT DIFFICULT AT ALL
6. BECOMING EASILY ANNOYED OR IRRITABLE: NOT AT ALL
7. FEELING AFRAID AS IF SOMETHING AWFUL MIGHT HAPPEN: SEVERAL DAYS
5. BEING SO RESTLESS THAT IT IS HARD TO SIT STILL: NOT AT ALL
GAD7 TOTAL SCORE: 2
1. FEELING NERVOUS, ANXIOUS, OR ON EDGE: SEVERAL DAYS
GAD7 TOTAL SCORE: 2
7. FEELING AFRAID AS IF SOMETHING AWFUL MIGHT HAPPEN: SEVERAL DAYS
GAD7 TOTAL SCORE: 2
IF YOU CHECKED OFF ANY PROBLEMS ON THIS QUESTIONNAIRE, HOW DIFFICULT HAVE THESE PROBLEMS MADE IT FOR YOU TO DO YOUR WORK, TAKE CARE OF THINGS AT HOME, OR GET ALONG WITH OTHER PEOPLE: NOT DIFFICULT AT ALL
3. WORRYING TOO MUCH ABOUT DIFFERENT THINGS: NOT AT ALL
2. NOT BEING ABLE TO STOP OR CONTROL WORRYING: NOT AT ALL
4. TROUBLE RELAXING: NOT AT ALL

## 2025-06-26 ENCOUNTER — OFFICE VISIT (OUTPATIENT)
Dept: FAMILY MEDICINE | Facility: CLINIC | Age: 50
End: 2025-06-26
Payer: COMMERCIAL

## 2025-06-26 VITALS
DIASTOLIC BLOOD PRESSURE: 74 MMHG | RESPIRATION RATE: 24 BRPM | HEART RATE: 72 BPM | WEIGHT: 190 LBS | BODY MASS INDEX: 31.65 KG/M2 | TEMPERATURE: 97.1 F | SYSTOLIC BLOOD PRESSURE: 110 MMHG | HEIGHT: 65 IN | OXYGEN SATURATION: 97 %

## 2025-06-26 DIAGNOSIS — F33.1 MODERATE EPISODE OF RECURRENT MAJOR DEPRESSIVE DISORDER (H): ICD-10-CM

## 2025-06-26 DIAGNOSIS — Z92.89 H/O CT SCAN: Primary | ICD-10-CM

## 2025-06-26 DIAGNOSIS — Z13.1 SCREENING FOR DIABETES MELLITUS: ICD-10-CM

## 2025-06-26 DIAGNOSIS — Z12.4 CERVICAL CANCER SCREENING: ICD-10-CM

## 2025-06-26 DIAGNOSIS — M06.9 RHEUMATOID ARTHRITIS OF WRIST, UNSPECIFIED LATERALITY, UNSPECIFIED WHETHER RHEUMATOID FACTOR PRESENT (H): ICD-10-CM

## 2025-06-26 LAB
HPV HR 12 DNA CVX QL NAA+PROBE: NEGATIVE
HPV16 DNA CVX QL NAA+PROBE: NEGATIVE
HPV18 DNA CVX QL NAA+PROBE: NEGATIVE
HUMAN PAPILLOMA VIRUS FINAL DIAGNOSIS: NORMAL

## 2025-06-26 RX ORDER — SERTRALINE HYDROCHLORIDE 100 MG/1
200 TABLET, FILM COATED ORAL DAILY
Qty: 180 TABLET | Refills: 3 | Status: SHIPPED | OUTPATIENT
Start: 2025-06-26

## 2025-06-26 ASSESSMENT — ENCOUNTER SYMPTOMS
CARDIOVASCULAR NEGATIVE: 1
CONSTITUTIONAL NEGATIVE: 1
PSYCHIATRIC NEGATIVE: 1
RESPIRATORY NEGATIVE: 1
EYES NEGATIVE: 1
ALLERGIC/IMMUNOLOGIC NEGATIVE: 1
GASTROINTESTINAL NEGATIVE: 1
MUSCULOSKELETAL NEGATIVE: 1
ENDOCRINE NEGATIVE: 1
HEMATOLOGIC/LYMPHATIC NEGATIVE: 1
NEUROLOGICAL NEGATIVE: 1

## 2025-06-26 ASSESSMENT — PAIN SCALES - GENERAL: PAINLEVEL_OUTOF10: NO PAIN (0)

## 2025-06-26 NOTE — PROGRESS NOTES
Sofia was seen today for imaging follow up, depression, imm/inj and pap smear.    Diagnoses and all orders for this visit:    H/O CT scan        -     discussed findings with patient- reassured patient  Rheumatoid arthritis of wrist, unspecified laterality, unspecified whether rheumatoid factor present (H)         -    stable. No new symptoms    Screening for diabetes mellitus    Cervical cancer screening  -     HPV and Gynecologic Cytology Panel - Recommended Age 30 - 65 Years    Moderate episode of recurrent major depressive disorder (H)  -     sertraline (ZOLOFT) 100 MG tablet; Take 2 tablets (200 mg) by mouth daily.  -     medication faxed.          Subjective   Sofia is a 50 year old, presenting for the following health issues:    Patient is a 50 yr old female here for concerns of a recent CT done in the Fall of 24. She had a colonoscopy at [facility] and was told that there was possible cecal mass. A CT was done . She wanted to discuss the CT findings. We went over the CT scan results. There were no severe abnormalities. Patient was reassured.  She is due for a pap smear and was open to getting this done today.    Patient is also requesting refills on her antidepressants. She reports that the medication is helping with depression but she is still quite anxious.   Patient says she is back in counseling and that appears to be helping.       Imaging follow up (Wanting to follow up on her CT scan of the abdomen and pelvis done on 12-4-24.  Colonoscopy was done on 11-22-24.), Depression (Refill of medication.), Imm/Inj (Discuss if she is due for a Covid injection.  Last one was 1-6-24.), and Pap smear (Patient is due for a pap smear.  If the MD has time she would like to have this done today.)        6/26/2025     7:25 AM   Additional Questions   Roomed by Mireya Mayberry CMA   Accompanied by Self     History of Present Illness       Reason for visit:  Medication refill and catscan results   She is taking  medications regularly.        Chief Complaint   Patient presents with    Imaging follow up     Wanting to follow up on her CT scan of the abdomen and pelvis done on 12-4-24.  Colonoscopy was done on 11-22-24.    Depression     Refill of medication.    Imm/Inj     Discuss if she is due for a Covid injection.  Last one was 1-6-24.    Pap smear     Patient is due for a pap smear.  If the MD has time she would like to have this done today.       Depression   How are you doing with your depression since your last visit? Worsened but she is going to counseling which has been helpful.  She is able to do her everyday activities.  Are you having other symptoms that might be associated with depression? No  Have you had a significant life event?  No   Are you feeling anxious or having panic attacks?   Yes:  the last couple of months she was feeling anxious.  That is why she decided to go back to counseling.  Do you have any concerns with your use of alcohol or other drugs? No    Social History     Tobacco Use    Smoking status: Every Day     Current packs/day: 1.00     Average packs/day: 1 pack/day for 20.0 years (20.0 ttl pk-yrs)     Types: Cigarettes    Smokeless tobacco: Never    Tobacco comments:     1 ppd   Vaping Use    Vaping status: Never Used   Substance Use Topics    Alcohol use: Yes     Comment: very rarely    Drug use: No         4/26/2023     7:21 AM 4/23/2024     9:14 AM 6/25/2025     6:33 PM   PHQ   PHQ-9 Total Score 3 1 2    Q9: Thoughts of better off dead/self-harm past 2 weeks Not at all  Not at all Not at all       Patient-reported    Proxy-reported         4/5/2022     6:49 AM 4/23/2024     9:17 AM 6/25/2025     6:34 PM   BRENDEN-7 SCORE   Total Score 0 (minimal anxiety) 3 (minimal anxiety) 2 (minimal anxiety)   Total Score 0 3 2        Patient-reported         6/25/2025     6:33 PM   Last PHQ-9   1.  Little interest or pleasure in doing things 0   2.  Feeling down, depressed, or hopeless 0   3.  Trouble  falling or staying asleep, or sleeping too much 1   4.  Feeling tired or having little energy 1   5.  Poor appetite or overeating 0   6.  Feeling bad about yourself 0   7.  Trouble concentrating 0   8.  Moving slowly or restless 0   Q9: Thoughts of better off dead/self-harm past 2 weeks 0   PHQ-9 Total Score 2        Patient-reported         6/25/2025     6:34 PM   BRENDEN-7    1. Feeling nervous, anxious, or on edge 1   2. Not being able to stop or control worrying 0   3. Worrying too much about different things 0   4. Trouble relaxing 0   5. Being so restless that it is hard to sit still 0   6. Becoming easily annoyed or irritable 0   7. Feeling afraid, as if something awful might happen 1   BRENDEN-7 Total Score 2    If you checked any problems, how difficult have they made it for you to do your work, take care of things at home, or get along with other people? Not difficult at all       Patient-reported       Suicide Assessment Five-step Evaluation and Treatment (SAFE-T)    How many servings of fruits and vegetables do you eat daily?  2-3  On average, how many sweetened beverages do you drink each day (Examples: soda, juice, sweet tea, etc.  Do NOT count diet or artificially sweetened beverages)?   0  How many days per week do you exercise enough to make your heart beat faster? 3 or less  How many minutes a day do you exercise enough to make your heart beat faster? 45-50 minutes.  How many days per week do you miss taking your medication? 0          Review of Systems   Constitutional: Negative.    HENT: Negative.     Eyes: Negative.    Respiratory: Negative.     Cardiovascular: Negative.    Gastrointestinal: Negative.    Endocrine: Negative.    Genitourinary: Negative.    Musculoskeletal: Negative.    Skin: Negative.    Allergic/Immunologic: Negative.    Neurological: Negative.    Hematological: Negative.    Psychiatric/Behavioral: Negative.            Objective    /74 (BP Location: Right arm, Patient Position:  "Chair, Cuff Size: Adult Regular)   Pulse 72   Temp 97.1  F (36.2  C) (Tympanic)   Resp 24   Ht 1.638 m (5' 4.5\")   Wt 86.2 kg (190 lb)   SpO2 97%   BMI 32.11 kg/m    Body mass index is 32.11 kg/m .  Physical Exam   GENERAL: alert and no distress  EYES: Eyes grossly normal to inspection, PERRL and conjunctivae and sclerae normal  HENT: ear canals and TM's normal, nose and mouth without ulcers or lesions  NECK: no adenopathy, no asymmetry, masses, or scars  RESP: lungs clear to auscultation - no rales, rhonchi or wheezes  CV: regular rate and rhythm, normal S1 S2, no S3 or S4, no murmur, click or rub, no peripheral edema  ABDOMEN: soft, nontender, no hepatosplenomegaly, no masses and bowel sounds normal   (female) w/bimanual: normal female external genitalia, normal urethral meatus, normal vaginal mucosa, normal cervix/adnexa/uterus without masses or discharge, pap smear obtained  MS: no gross musculoskeletal defects noted, no edema            Signed Electronically by: Zac Farmer MD    "

## 2025-06-27 ENCOUNTER — RESULTS FOLLOW-UP (OUTPATIENT)
Dept: OBGYN | Facility: CLINIC | Age: 50
End: 2025-06-27

## 2025-07-01 LAB
BKR AP ASSOCIATED HPV REPORT: NORMAL
BKR LAB AP GYN ADEQUACY: NORMAL
BKR LAB AP GYN INTERPRETATION: NORMAL
BKR LAB AP PREVIOUS ABNORMAL: NORMAL
PATH REPORT.COMMENTS IMP SPEC: NORMAL
PATH REPORT.COMMENTS IMP SPEC: NORMAL
PATH REPORT.RELEVANT HX SPEC: NORMAL